# Patient Record
Sex: FEMALE | Race: BLACK OR AFRICAN AMERICAN | NOT HISPANIC OR LATINO | Employment: UNEMPLOYED | URBAN - METROPOLITAN AREA
[De-identification: names, ages, dates, MRNs, and addresses within clinical notes are randomized per-mention and may not be internally consistent; named-entity substitution may affect disease eponyms.]

---

## 2023-06-28 ENCOUNTER — APPOINTMENT (OUTPATIENT)
Dept: PRIMARY CARE | Facility: CLINIC | Age: 58
End: 2023-06-28
Payer: COMMERCIAL

## 2023-07-21 PROBLEM — R01.1 SYSTOLIC MURMUR: Status: ACTIVE | Noted: 2023-07-21

## 2023-07-21 PROBLEM — J44.9 CHRONIC OBSTRUCTIVE PULMONARY DISEASE (MULTI): Status: ACTIVE | Noted: 2023-07-21

## 2023-07-21 PROBLEM — G89.29 CHRONIC PAIN: Status: ACTIVE | Noted: 2023-07-21

## 2023-07-21 PROBLEM — N60.19 FIBROCYSTIC BREAST: Status: ACTIVE | Noted: 2023-07-21

## 2023-07-21 PROBLEM — R79.82 ELEVATED C-REACTIVE PROTEIN (CRP): Status: ACTIVE | Noted: 2023-07-21

## 2023-07-21 PROBLEM — Z85.51 HISTORY OF BLADDER CANCER: Status: ACTIVE | Noted: 2023-07-21

## 2023-07-21 PROBLEM — N39.41 URGE INCONTINENCE OF URINE: Status: ACTIVE | Noted: 2023-07-21

## 2023-07-21 PROBLEM — M19.90 OSTEOARTHRITIS: Status: ACTIVE | Noted: 2023-07-21

## 2023-07-21 PROBLEM — J31.0 RHINITIS: Status: ACTIVE | Noted: 2023-07-21

## 2023-07-21 PROBLEM — G47.00 INSOMNIA: Status: ACTIVE | Noted: 2023-07-21

## 2023-07-21 PROBLEM — R20.0 NUMBNESS: Status: ACTIVE | Noted: 2023-07-21

## 2023-07-21 PROBLEM — B35.1 ONYCHOMYCOSIS: Status: ACTIVE | Noted: 2023-07-21

## 2023-07-21 PROBLEM — R68.89 COLD INTOLERANCE: Status: ACTIVE | Noted: 2023-07-21

## 2023-07-21 PROBLEM — G62.9 NEUROPATHY: Status: ACTIVE | Noted: 2023-07-21

## 2023-07-21 PROBLEM — M79.89 RIGHT LEG SWELLING: Status: ACTIVE | Noted: 2023-07-21

## 2023-07-21 PROBLEM — L03.90 CELLULITIS: Status: ACTIVE | Noted: 2023-07-21

## 2023-07-21 PROBLEM — M51.369 DEGENERATIVE DISC DISEASE, LUMBAR: Status: ACTIVE | Noted: 2023-07-21

## 2023-07-21 PROBLEM — G89.29 CHRONIC ABDOMINAL PAIN: Status: ACTIVE | Noted: 2023-07-21

## 2023-07-21 PROBLEM — M79.601 BILATERAL ARM PAIN: Status: ACTIVE | Noted: 2023-07-21

## 2023-07-21 PROBLEM — G56.00 CARPAL TUNNEL SYNDROME: Status: ACTIVE | Noted: 2023-07-21

## 2023-07-21 PROBLEM — H10.30 CONJUNCTIVITIS, ACUTE: Status: ACTIVE | Noted: 2023-07-21

## 2023-07-21 PROBLEM — R53.83 FATIGUE: Status: ACTIVE | Noted: 2023-07-21

## 2023-07-21 PROBLEM — M79.602 BILATERAL ARM PAIN: Status: ACTIVE | Noted: 2023-07-21

## 2023-07-21 PROBLEM — F20.0 PARANOID SCHIZOPHRENIA (MULTI): Status: ACTIVE | Noted: 2023-07-21

## 2023-07-21 PROBLEM — R92.8 MAMMOGRAM ABNORMAL: Status: ACTIVE | Noted: 2023-07-21

## 2023-07-21 PROBLEM — N95.1 HOT FLASHES, MENOPAUSAL: Status: ACTIVE | Noted: 2023-07-21

## 2023-07-21 PROBLEM — D49.4 NEOPLASM OF BLADDER: Status: ACTIVE | Noted: 2023-07-21

## 2023-07-21 PROBLEM — M51.26 LUMBAR DISC HERNIATION: Status: ACTIVE | Noted: 2023-07-21

## 2023-07-21 PROBLEM — M79.10 MYALGIA: Status: ACTIVE | Noted: 2023-07-21

## 2023-07-21 PROBLEM — G60.9 IDIOPATHIC PERIPHERAL NEUROPATHY: Status: ACTIVE | Noted: 2023-07-21

## 2023-07-21 PROBLEM — M54.2 CERVICALGIA: Status: ACTIVE | Noted: 2023-07-21

## 2023-07-21 PROBLEM — F17.200 NICOTINE DEPENDENCE: Status: ACTIVE | Noted: 2023-07-21

## 2023-07-21 PROBLEM — M54.9 BACK PAIN, ACUTE: Status: ACTIVE | Noted: 2023-07-21

## 2023-07-21 PROBLEM — F41.9 ANXIETY: Status: ACTIVE | Noted: 2023-07-21

## 2023-07-21 PROBLEM — K52.9 GASTROENTERITIS: Status: ACTIVE | Noted: 2023-07-21

## 2023-07-21 PROBLEM — M62.838 MUSCLE SPASMS OF NECK: Status: ACTIVE | Noted: 2023-07-21

## 2023-07-21 PROBLEM — M25.522 LEFT ELBOW PAIN: Status: ACTIVE | Noted: 2023-07-21

## 2023-07-21 PROBLEM — M25.512 LEFT SHOULDER PAIN: Status: ACTIVE | Noted: 2023-07-21

## 2023-07-21 PROBLEM — R68.82 DECREASED SEX DRIVE: Status: ACTIVE | Noted: 2023-07-21

## 2023-07-21 PROBLEM — M51.36 DEGENERATIVE DISC DISEASE, LUMBAR: Status: ACTIVE | Noted: 2023-07-21

## 2023-07-21 PROBLEM — F40.01: Status: ACTIVE | Noted: 2023-07-21

## 2023-07-21 PROBLEM — R10.9 CHRONIC ABDOMINAL PAIN: Status: ACTIVE | Noted: 2023-07-21

## 2023-07-21 PROBLEM — I82.431 DEEP VEIN THROMBOSIS (DVT) OF POPLITEAL VEIN OF RIGHT LOWER EXTREMITY (MULTI): Status: ACTIVE | Noted: 2023-07-21

## 2023-07-21 PROBLEM — K21.9 GERD (GASTROESOPHAGEAL REFLUX DISEASE): Status: ACTIVE | Noted: 2023-07-21

## 2023-07-21 PROBLEM — F31.9: Status: ACTIVE | Noted: 2023-07-21

## 2023-07-21 PROBLEM — E66.3 OVERWEIGHT: Status: ACTIVE | Noted: 2023-07-21

## 2023-07-21 PROBLEM — N64.4 BREAST PAIN: Status: ACTIVE | Noted: 2023-07-21

## 2023-07-21 RX ORDER — ESCITALOPRAM OXALATE 10 MG/1
TABLET ORAL
COMMUNITY
Start: 2023-05-27

## 2023-07-21 RX ORDER — ACETAMINOPHEN AND CODEINE PHOSPHATE 300; 30 MG/1; MG/1
TABLET ORAL
COMMUNITY
Start: 2023-05-02

## 2023-07-21 RX ORDER — VORTIOXETINE 10 MG/1
TABLET, FILM COATED ORAL
COMMUNITY
Start: 2023-02-27

## 2023-07-21 RX ORDER — NAPROXEN 500 MG/1
TABLET ORAL
COMMUNITY
Start: 2023-05-02

## 2023-07-21 RX ORDER — ORPHENADRINE CITRATE 100 MG/1
TABLET, EXTENDED RELEASE ORAL
COMMUNITY
Start: 2022-11-29

## 2023-07-21 RX ORDER — BUDESONIDE AND FORMOTEROL FUMARATE DIHYDRATE 80; 4.5 UG/1; UG/1
AEROSOL RESPIRATORY (INHALATION)
COMMUNITY
Start: 2023-05-16

## 2023-07-21 RX ORDER — TRAZODONE HYDROCHLORIDE 50 MG/1
TABLET ORAL
COMMUNITY
Start: 2023-06-19

## 2023-07-21 RX ORDER — AMITRIPTYLINE HYDROCHLORIDE 10 MG/1
TABLET, FILM COATED ORAL
COMMUNITY
Start: 2020-01-22

## 2023-07-21 RX ORDER — OXYCODONE AND ACETAMINOPHEN 5; 325 MG/1; MG/1
TABLET ORAL
COMMUNITY
Start: 2023-05-30

## 2023-07-21 RX ORDER — ARIPIPRAZOLE 5 MG/1
TABLET ORAL
COMMUNITY
Start: 2020-01-22

## 2023-07-21 RX ORDER — BREXPIPRAZOLE 1 MG/1
TABLET ORAL
COMMUNITY
Start: 2023-04-20

## 2023-07-21 RX ORDER — CYCLOBENZAPRINE HCL 5 MG
TABLET ORAL
COMMUNITY
Start: 2022-10-16

## 2023-07-21 RX ORDER — COLCHICINE 0.6 MG/1
TABLET ORAL
COMMUNITY
Start: 2022-12-20

## 2023-07-21 RX ORDER — GABAPENTIN 400 MG/1
CAPSULE ORAL
COMMUNITY
Start: 2019-08-19

## 2023-07-21 RX ORDER — BACLOFEN 20 MG/1
TABLET ORAL
COMMUNITY
Start: 2019-08-19

## 2023-07-21 RX ORDER — FLUTICASONE PROPIONATE 50 MCG
SPRAY, SUSPENSION (ML) NASAL
COMMUNITY
Start: 2023-06-14

## 2023-07-21 RX ORDER — ALBUTEROL SULFATE 90 UG/1
AEROSOL, METERED RESPIRATORY (INHALATION)
COMMUNITY

## 2023-07-25 ENCOUNTER — APPOINTMENT (OUTPATIENT)
Dept: PRIMARY CARE | Facility: CLINIC | Age: 58
End: 2023-07-25
Payer: COMMERCIAL

## 2024-08-07 ENCOUNTER — HOSPITAL ENCOUNTER (OUTPATIENT)
Facility: HOSPITAL | Age: 59
Setting detail: OBSERVATION
Discharge: OTHER INSTITUTION | End: 2024-08-08
Attending: EMERGENCY MEDICINE | Admitting: STUDENT IN AN ORGANIZED HEALTH CARE EDUCATION/TRAINING PROGRAM
Payer: COMMERCIAL

## 2024-08-07 ENCOUNTER — CLINICAL SUPPORT (OUTPATIENT)
Dept: EMERGENCY MEDICINE | Facility: HOSPITAL | Age: 59
End: 2024-08-07
Payer: COMMERCIAL

## 2024-08-07 DIAGNOSIS — R45.851 SUICIDAL IDEATION: Primary | ICD-10-CM

## 2024-08-07 DIAGNOSIS — F32.A DEPRESSION, UNSPECIFIED DEPRESSION TYPE: ICD-10-CM

## 2024-08-07 LAB
ALBUMIN SERPL BCP-MCNC: 4.7 G/DL (ref 3.4–5)
ALP SERPL-CCNC: 118 U/L (ref 33–110)
ALT SERPL W P-5'-P-CCNC: 11 U/L (ref 7–45)
ANION GAP SERPL CALC-SCNC: 18 MMOL/L (ref 10–20)
APAP SERPL-MCNC: <10 UG/ML
AST SERPL W P-5'-P-CCNC: 18 U/L (ref 9–39)
ATRIAL RATE: 69 BPM
BASOPHILS # BLD AUTO: 0.05 X10*3/UL (ref 0–0.1)
BASOPHILS NFR BLD AUTO: 0.8 %
BILIRUB SERPL-MCNC: 1.1 MG/DL (ref 0–1.2)
BUN SERPL-MCNC: 8 MG/DL (ref 6–23)
CALCIUM SERPL-MCNC: 9.9 MG/DL (ref 8.6–10.6)
CHLORIDE SERPL-SCNC: 106 MMOL/L (ref 98–107)
CO2 SERPL-SCNC: 21 MMOL/L (ref 21–32)
CREAT SERPL-MCNC: 0.84 MG/DL (ref 0.5–1.05)
EGFRCR SERPLBLD CKD-EPI 2021: 80 ML/MIN/1.73M*2
EOSINOPHIL # BLD AUTO: 0.59 X10*3/UL (ref 0–0.7)
EOSINOPHIL NFR BLD AUTO: 9 %
ERYTHROCYTE [DISTWIDTH] IN BLOOD BY AUTOMATED COUNT: 12.5 % (ref 11.5–14.5)
ETHANOL SERPL-MCNC: <10 MG/DL
GLUCOSE SERPL-MCNC: 85 MG/DL (ref 74–99)
HCT VFR BLD AUTO: 47.4 % (ref 36–46)
HGB BLD-MCNC: 16.6 G/DL (ref 12–16)
HOLD SPECIMEN: NORMAL
IMM GRANULOCYTES # BLD AUTO: 0.02 X10*3/UL (ref 0–0.7)
IMM GRANULOCYTES NFR BLD AUTO: 0.3 % (ref 0–0.9)
LYMPHOCYTES # BLD AUTO: 2.68 X10*3/UL (ref 1.2–4.8)
LYMPHOCYTES NFR BLD AUTO: 40.8 %
MCH RBC QN AUTO: 30 PG (ref 26–34)
MCHC RBC AUTO-ENTMCNC: 35 G/DL (ref 32–36)
MCV RBC AUTO: 86 FL (ref 80–100)
MONOCYTES # BLD AUTO: 0.5 X10*3/UL (ref 0.1–1)
MONOCYTES NFR BLD AUTO: 7.6 %
NEUTROPHILS # BLD AUTO: 2.73 X10*3/UL (ref 1.2–7.7)
NEUTROPHILS NFR BLD AUTO: 41.5 %
NRBC BLD-RTO: 0 /100 WBCS (ref 0–0)
P AXIS: 33 DEGREES
P OFFSET: 211 MS
P ONSET: 170 MS
PLATELET # BLD AUTO: 286 X10*3/UL (ref 150–450)
POTASSIUM SERPL-SCNC: 3.7 MMOL/L (ref 3.5–5.3)
PR INTERVAL: 110 MS
PROT SERPL-MCNC: 8.6 G/DL (ref 6.4–8.2)
Q ONSET: 225 MS
QRS COUNT: 12 BEATS
QRS DURATION: 84 MS
QT INTERVAL: 422 MS
QTC CALCULATION(BAZETT): 452 MS
QTC FREDERICIA: 442 MS
R AXIS: 55 DEGREES
RBC # BLD AUTO: 5.53 X10*6/UL (ref 4–5.2)
SALICYLATES SERPL-MCNC: <3 MG/DL
SODIUM SERPL-SCNC: 141 MMOL/L (ref 136–145)
T AXIS: 59 DEGREES
T OFFSET: 436 MS
VENTRICULAR RATE: 69 BPM
WBC # BLD AUTO: 6.6 X10*3/UL (ref 4.4–11.3)

## 2024-08-07 PROCEDURE — 93005 ELECTROCARDIOGRAM TRACING: CPT

## 2024-08-07 PROCEDURE — 99285 EMERGENCY DEPT VISIT HI MDM: CPT

## 2024-08-07 PROCEDURE — 80053 COMPREHEN METABOLIC PANEL: CPT | Performed by: NURSE PRACTITIONER

## 2024-08-07 PROCEDURE — 99285 EMERGENCY DEPT VISIT HI MDM: CPT | Performed by: NURSE PRACTITIONER

## 2024-08-07 PROCEDURE — 85025 COMPLETE CBC W/AUTO DIFF WBC: CPT | Performed by: NURSE PRACTITIONER

## 2024-08-07 PROCEDURE — 36415 COLL VENOUS BLD VENIPUNCTURE: CPT | Performed by: NURSE PRACTITIONER

## 2024-08-07 PROCEDURE — 80143 DRUG ASSAY ACETAMINOPHEN: CPT | Performed by: NURSE PRACTITIONER

## 2024-08-07 PROCEDURE — 93010 ELECTROCARDIOGRAM REPORT: CPT | Performed by: NURSE PRACTITIONER

## 2024-08-07 SDOH — HEALTH STABILITY: MENTAL HEALTH: IN THE PAST FEW WEEKS, HAVE YOU FELT THAT YOU OR YOUR FAMILY WOULD BE BETTER OFF IF YOU WERE DEAD?: YES

## 2024-08-07 SDOH — HEALTH STABILITY: MENTAL HEALTH: IN THE PAST WEEK, HAVE YOU BEEN HAVING THOUGHTS ABOUT KILLING YOURSELF?: YES

## 2024-08-07 SDOH — HEALTH STABILITY: MENTAL HEALTH: HAVE YOU EVER TRIED TO KILL YOURSELF?: YES

## 2024-08-07 SDOH — HEALTH STABILITY: MENTAL HEALTH: WHEN DID YOU TRY TO KILL YOURSELF?: LAST YEAR

## 2024-08-07 SDOH — HEALTH STABILITY: MENTAL HEALTH: HOW DID YOU TRY TO KILL YOURSELF?: OD

## 2024-08-07 SDOH — HEALTH STABILITY: MENTAL HEALTH: ARE YOU HAVING THOUGHTS OF KILLING YOURSELF RIGHT NOW?: NO

## 2024-08-07 SDOH — HEALTH STABILITY: MENTAL HEALTH: SUICIDAL BEHAVIOR (LIFETIME): YES

## 2024-08-07 SDOH — HEALTH STABILITY: MENTAL HEALTH: SUICIDAL BEHAVIOR (DESCRIPTION): OD LAST YEAR

## 2024-08-07 SDOH — HEALTH STABILITY: MENTAL HEALTH: ACTIVE SUICIDAL IDEATION WITH SPECIFIC PLAN AND INTENT (PAST 1 MONTH): YES

## 2024-08-07 SDOH — HEALTH STABILITY: MENTAL HEALTH: ANXIETY SYMPTOMS: GENERALIZED

## 2024-08-07 SDOH — HEALTH STABILITY: MENTAL HEALTH: IN THE PAST FEW WEEKS, HAVE YOU WISHED YOU WERE DEAD?: YES

## 2024-08-07 SDOH — ECONOMIC STABILITY: HOUSING INSECURITY

## 2024-08-07 SDOH — HEALTH STABILITY: MENTAL HEALTH: WISH TO BE DEAD (PAST 1 MONTH): YES

## 2024-08-07 SDOH — HEALTH STABILITY: MENTAL HEALTH: NON-SPECIFIC ACTIVE SUICIDAL THOUGHTS (PAST 1 MONTH): YES

## 2024-08-07 SDOH — HEALTH STABILITY: MENTAL HEALTH: ACTIVE SUICIDAL IDEATION WITH SOME INTENT TO ACT, WITHOUT SPECIFIC PLAN (PAST 1 MONTH): YES

## 2024-08-07 SDOH — HEALTH STABILITY: MENTAL HEALTH
DEPRESSION SYMPTOMS: APPETITE CHANGE;CHANGE IN ENERGY LEVEL;CRYING;FEELINGS OF HELPLESSNESS;FEELINGS OF WORTHLESSNESS;FEELINGS OF HOPELESSESS;IMPAIRED CONCENTRATION;LOSS OF INTEREST;ISOLATIVE;SLEEP DISTURBANCE

## 2024-08-07 SDOH — HEALTH STABILITY: MENTAL HEALTH: SUICIDAL BEHAVIOR (3 MONTHS): NO

## 2024-08-07 ASSESSMENT — PAIN - FUNCTIONAL ASSESSMENT: PAIN_FUNCTIONAL_ASSESSMENT: 0-10

## 2024-08-07 ASSESSMENT — LIFESTYLE VARIABLES
HAVE PEOPLE ANNOYED YOU BY CRITICIZING YOUR DRINKING: NO
EVER FELT BAD OR GUILTY ABOUT YOUR DRINKING: NO
HAVE YOU EVER FELT YOU SHOULD CUT DOWN ON YOUR DRINKING: NO
PRESCIPTION_ABUSE_PAST_12_MONTHS: NO
SUBSTANCE_ABUSE_PAST_12_MONTHS: YES
TOTAL SCORE: 0
EVER HAD A DRINK FIRST THING IN THE MORNING TO STEADY YOUR NERVES TO GET RID OF A HANGOVER: NO

## 2024-08-07 ASSESSMENT — PAIN SCALES - GENERAL: PAINLEVEL_OUTOF10: 0 - NO PAIN

## 2024-08-07 ASSESSMENT — COLUMBIA-SUICIDE SEVERITY RATING SCALE - C-SSRS
4. HAVE YOU HAD THESE THOUGHTS AND HAD SOME INTENTION OF ACTING ON THEM?: NO
5. HAVE YOU STARTED TO WORK OUT OR WORKED OUT THE DETAILS OF HOW TO KILL YOURSELF? DO YOU INTEND TO CARRY OUT THIS PLAN?: NO
6. HAVE YOU EVER DONE ANYTHING, STARTED TO DO ANYTHING, OR PREPARED TO DO ANYTHING TO END YOUR LIFE?: NO
2. HAVE YOU ACTUALLY HAD ANY THOUGHTS OF KILLING YOURSELF?: NO
1. IN THE PAST MONTH, HAVE YOU WISHED YOU WERE DEAD OR WISHED YOU COULD GO TO SLEEP AND NOT WAKE UP?: YES

## 2024-08-07 NOTE — ED PROVIDER NOTES
"HPI   No chief complaint on file.        History provided by:  Patient   used: No        59-year-old female with past medical history anxiety, depression and schizophrenia presents for psychiatric evaluation accompanied by police.  Per police they were called because the patient was crying after relapsing on crack cocaine and was kicked out of her house 4 days ago.  She states that she is hearing voices that are telling her to kill herself but there is no plan.  She denies any visual hallucinations or homicidal ideation.  She states that she has not taken her medications including Seroquel and \"however long \"but will not give a reason why.  She states she has not eaten in 4 days.  She denies any trauma, fall, injury or anticoagulation use.  Denies any excessive alcohol use.  Denies any chest pain or shortness of breath.  Denies any additional symptoms or complaints this time.    ROS is otherwise negative unless stated above.    Patient History   Past Medical History:   Diagnosis Date    Personal history of malignant neoplasm of bladder 02/29/2016    History of malignant neoplasm of bladder    Personal history of nicotine dependence 05/07/2019    History of nicotine dependence    Personal history of other diseases of the female genital tract 05/07/2019    History of breast pain    Personal history of other diseases of the musculoskeletal system and connective tissue 09/07/2017    History of muscle pain    Personal history of other specified conditions 05/07/2019    History of insomnia    Personal history of other specified conditions 04/18/2017    History of insomnia    Polyneuropathy, unspecified 11/29/2022    Neuropathy    Unspecified convulsions (Multi) 12/13/2015    Seizure     Past Surgical History:   Procedure Laterality Date    BLADDER SURGERY  02/12/2014    Bladder Surgery    CARPAL TUNNEL RELEASE  03/27/2014    Neuroplasty Decompression Median Nerve At Carpal Tunnel    OTHER SURGICAL " HISTORY  02/04/2019    Laparotomy     Family History   Problem Relation Name Age of Onset    No Known Problems Mother      No Known Problems Father       Social History     Tobacco Use    Smoking status: Not on file    Smokeless tobacco: Not on file   Substance Use Topics    Alcohol use: Not on file    Drug use: Not on file       Physical Exam   ED Triage Vitals   Temp Pulse Resp BP   -- -- -- --      SpO2 Temp src Heart Rate Source Patient Position   -- -- -- --      BP Location FiO2 (%)     -- --       Physical Exam    VS: As documented in the triage note and EMR flowsheet from this visit were reviewed.    GEN: NAD, nontoxic, unkempt-appearing, ambulates without difficulty  EYES:  EOMs grossly intact, anicteric sclera, no nystagmus noted, clear and equal bilaterally, no foreign body noted  HEENT: Airway patent, ears with clear tympanic membranes bilaterally. Nasal mucosa clear. Mouth with normal mucosa.  No area of abscess or fluctuance noted.  No drainage noted. Throat has no vesicles, no oropharyngeal exudates and uvula is midline. Face with no lymph node enlargement. No trismus or drooling noted.  Handling secretions.  Speech clear.  CARD: RRR, nontender chest, no crepitus deformities, no JVD, no murmurs rubs or gallops ; No edema noted.  Positive pulses bilaterally throughout.  Capillary refill less than 3 seconds.  No abnormal redness, warmth, tenderness or swelling noted to bilateral lower extremities.  PULMONARY: Clear all lung fields. Moving air well, Nonlabored, no accessory muscle use, able to speak complete sentences  ABDOMEN: Abdomen soft, non-distended, no rebound, no guarding. Bowel sounds normal in all 4 quadrants. No tenderness to palpation.  No CVA tenderness.  No masses or organomegaly noted.  No evidence of peritonitis. Negative Allen's and McBurney's point tenderness.    : deferred  MUSK: Spine appears normal, range of motion is not limited, no muscle or joint tenderness. Strength 5 out of 5  equal bilaterally throughout.  No step-offs, deformities or additional signs of trauma noted.  No spinal/midline tenderness to palpation  SKIN: Skin normal color for race, warm, dry and intact. No evidence of trauma. No rash noted.  NEURO: Alert and oriented x 3, speech is clear, no obvious deficits noted. No facial droop noted.    PSYCH: Alert and oriented to person, place, time/situation.  Depressed, tearful mood and affect. No apparent risk to others. Thoughts are linear.  Does appear decompensated.  Does not appear internally stimulated.  LYMPH: No adenopathy or splenomegaly. No cervical, supraclavicular or inguinal lymphadenopathy.  ED Course & MDM   ED Course as of 08/07/24 1602   Wed Aug 07, 2024   1246 Electrocardiogram, 12-lead  Interpreted by me.  8/7/2024 at 1228.  Sinus rhythm 69 bpm.   QRS 84 QTc 452.  No ST elevation. [MC]      ED Course User Index  [MC] Yefri Yin MD         Diagnoses as of 08/07/24 1602   Suicidal ideation   Depression, unspecified depression type                 No data recorded                                 Medical Decision Making    Upon assessment patient is a tearful, unkempt female in no apparent distress.  She is ambulating independently without difficulty or dyspnea.  She is calm and cooperative and does not appear to be hallucinating on my examination.  Assessment is otherwise reassuring.  Plan is to obtain laboratory studies, urine studies, EKG and speak with EPAT.  She required no medications at this time.    Workup was reviewed.  EPAT did perform the evaluation and recommended inpatient psychiatric placement for further care and management.  She is medically clear for placement.  She remained hemodynamically stable calm and cooperative throughout my ED course of care.  Findings and plan were discussed the patient and she acknowledged understanding of plan.  All questions and concerns were addressed prior to the end of my shift.  She is signed out to [facility]  provider pending EPAT placement.    EKG Interpretation: Normal sinus rhythm at a rate of 69, , QRS 84, QTc 452 without evidence of STEMI or ischemia    Differential Diagnoses Considered: Situational depression, hallucinations, decompensated schizophrenia, Decompensated depression, homelessness, anxiety, drug abuse    Chronic Medical Conditions Significantly Affecting Care: depression    External Records Reviewed: I reviewed recent and relevant outside records including: PCP notes, prior discharge summary, previous radiologic studies, HIE    Independent Interpretation of Studies:  I independently interpreted: none    Escalation of Care:  Signed out to oncoming provider pending EPAT placement    Social Determinants of Health Significantly Affecting Care:  Homelessness, lacking transportation, uninsured, unable to afford medications    Prescription Drug Consideration:N/A    Diagnostic testing considered: No additional indicated at this time    Discussion of Management with Other Providers:   I discussed the patient/results with: YUAN, oncoming provider      This patient was staffed with ED Attending Dr. Yin to review the plan of care during ED course.    *Please note that portions of this note may have been completed with a voice recognition program.  Efforts were made to edit the dictations but occasionally, words are mis-transcribed.      Procedure  Procedures     CHIKIS Riddle-MONET  08/07/24 1440       CHIKIS Riddle-CNP  08/07/24 160

## 2024-08-07 NOTE — ED TRIAGE NOTES
"Brought in by [facility] for psych eval. Pt states she relapse on cocaine and \"doesn't feel good about it\" pt states she is SI because of her relapse but does not have a plan. Pt denies HI/AH/AV at this time   "

## 2024-08-08 VITALS
RESPIRATION RATE: 18 BRPM | SYSTOLIC BLOOD PRESSURE: 138 MMHG | OXYGEN SATURATION: 97 % | WEIGHT: 153 LBS | BODY MASS INDEX: 27.11 KG/M2 | TEMPERATURE: 97.8 F | DIASTOLIC BLOOD PRESSURE: 85 MMHG | HEIGHT: 63 IN | HEART RATE: 63 BPM

## 2024-08-08 PROBLEM — R45.851 SUICIDAL IDEATION: Status: ACTIVE | Noted: 2024-08-08

## 2024-08-08 LAB
AMPHETAMINES UR QL SCN: ABNORMAL
BARBITURATES UR QL SCN: ABNORMAL
BENZODIAZ UR QL SCN: ABNORMAL
BZE UR QL SCN: ABNORMAL
CANNABINOIDS UR QL SCN: ABNORMAL
CK SERPL-CCNC: 69 U/L (ref 0–215)
FENTANYL+NORFENTANYL UR QL SCN: ABNORMAL
METHADONE UR QL SCN: ABNORMAL
OPIATES UR QL SCN: ABNORMAL
OXYCODONE+OXYMORPHONE UR QL SCN: ABNORMAL
PCP UR QL SCN: ABNORMAL

## 2024-08-08 PROCEDURE — 80307 DRUG TEST PRSMV CHEM ANLYZR: CPT | Performed by: NURSE PRACTITIONER

## 2024-08-08 PROCEDURE — 82550 ASSAY OF CK (CPK): CPT | Performed by: EMERGENCY MEDICINE

## 2024-08-08 PROCEDURE — G0378 HOSPITAL OBSERVATION PER HR: HCPCS

## 2024-08-08 PROCEDURE — 36415 COLL VENOUS BLD VENIPUNCTURE: CPT | Performed by: EMERGENCY MEDICINE

## 2024-08-08 NOTE — SIGNIFICANT EVENT
Application for Emergency Admission      Ready for Transfer?  Is the patient medically cleared for transfer to inpatient psychiatry: Yes  Has the patient been accepted to an inpatient psychiatric hospital: Yes    Application for Emergency Admission  IN ACCORDANCE WITH [place]122[place]  The Chief Clinical Officer of: Arianna 8/8/2024 .6:58 AM    Reason for Hospitalization  The undersigned has reason to believe that: Sylvia Archuleta Is a mentally ill person subject to hospitalization by court order under division B Section 5122.01 of the Revised Code, i.e., this person:    1.Yes  Represents a substantial risk of physical harm to self as manifested by evidence of threats of, or attempts at, suicide or serious self-inflicted bodily harm    2.No Represents a substantial risk of physical harm to others as manifested by evidence of recent homicidal or other violent behavior, evidence of recent threats that place another in reasonable fear of violent behavior and serious physical harm, or other evidence of present dangerousness    3.Yes Represents a substantial and immediate risk of serious physical impairment or injury to self as manifested by  evidence that the person is unable to provide for and is not providing for the person's basic physical needs because of the person's mental illness and that appropriate provision for those needs cannot be made  immediately available in the community    4.Yes Would benefit from treatment in a hospital for his mental illness and is in need of such treatment as manifested by evidence of behavior that creates a grave and imminent risk to substantial rights of others or  himself.    5.Yes Would benefit from treatment as manifested by evidence of behavior that indicates all of the following:       (a) The person is unlikely to survive safely in the community without supervision, based on a clinical determination.       (b) The person has a history of lack of compliance with  treatment for mental illness and one of the following applies:      (i) At least twice within the thirty-six months prior to the filing of an affidavit seeking court-ordered treatment of the person under section 5122.111 of the Revised Code, the lack of compliance has been a significant factor in necessitating hospitalization in a hospital or receipt of services in a forensic or other mental health unit of a correctional facility, provided that the thirty-six-month period shall be extended by the length of any hospitalization or incarceration of the person that occurred within the thirty-six-month period.      (ii) Within the forty-eight months prior to the filing of an affidavit seeking court-ordered treatment of the person under section 5122.111 of the Revised Code, the lack of compliance resulted in one or more acts of serious violent behavior toward self or others or threats of, or attempts at, serious physical harm to self or others, provided that the forty-eight-month period shall be extended by the length of any hospitalization or incarceration of the person that occurred within the forty-eight-month period.      (c) The person, as a result of mental illness, is unlikely to voluntarily participate in necessary treatment.       (d) In view of the person's treatment history and current behavior, the person is in need of treatment in order to prevent a relapse or deterioration that would be likely to result in substantial risk of serious harm to the person or others.    (e) Represents a substantial risk of physical harm to self or others if allowed to remain at liberty pending examination.    Therefore, it is requested that said person be admitted to the above named facility.    STATEMENT OF BELIEF    Must be filled out by one of the following: a psychiatrist, licensed physician, licensed clinical psychologist, health or ,  or .  (Statement shall include the circumstances under  which the individual was taken into custody and the reason for the person's belief that hospitalization is necessary. The statement shall also include a reference to efforts made to secure the individual's property at his residence if he was taken into custody there. Every reasonable and appropriate effort should be made to take this person into custody in the least conspicuous manner possible.)    Female with history of anxiety, depression, schizophrenia brought in for suicidal ideation.  Poor protective factors, having persistent thoughts of death, SI, helplessness, hopelessness with an increased risk of harm to herself that would benefit from inpatient safety and stabilization.    Angela Fink,  8/8/2024     _____________________________________________________________   Place of Employment: [employer] [employer]    STATEMENT OF OBSERVATION BY PSYCHIATRIST, LICENSED PHYSICIAN, OR LICENSED CLINICAL PSYCHOLOGIST, IF APPLICABLE    Place of Observation (e.g., [facility] mental health center, general hospital, office, emergency facility)  (If applicable, please complete)    Angela Fink,  8/8/2024    _____________________________________________________________

## 2024-08-08 NOTE — H&P
"Observation History and Physical  [facility] EMERGENCY MEDICINE           History of Present Illness     History provided by: Patient  Limitations to History: None  External Records Reviewed: see initial provider note      Patient History:  Sylvia Archuleta is a 59 y.o. female who presented to the emergency department with SI.     Sylvia Archuleta was escalated to observation status. Observation was necessary as they continue to require treatment and monitoring of their psychiatric illness while awaiting inpatient behavioral health bed availability.    Physical Exam     Visit Vitals  /80   Pulse 81   Temp 36.7 °C (98 °F)   Resp 17   Ht 1.6 m (5' 3\")   Wt 69.4 kg (153 lb)   SpO2 97%   BMI 27.10 kg/m²   BSA 1.76 m²       GENERAL:  The patient appears nourished and normally developed. Vital signs as documented.     PULMONARY:  Without any respiratory distress. Able to speak full sentences, no accessory muscle use    CARDIAC: Warm and well perfused. No cyanosis.    MUSCULOSKELETAL:   Able to ambulate, Non edematous, with no obvious deformities.     SKIN: No pallor. Intact.    NEURO:  No obvious neurological deficits.  Able to follow commands.    Psych: endorses SI with plan      Impression and Plan     ED Course as of 08/08/24 0002   Wed Aug 07, 2024   1246 Electrocardiogram, 12-lead  Interpreted by me.  8/7/2024 at 1228.  Sinus rhythm 69 bpm.   QRS 84 QTc 452.  No ST elevation. [MC]      ED Course User Index  [MC] Yefri Yin MD         Diagnoses as of 08/08/24 0002   Suicidal ideation   Depression, unspecified depression type        Sylvia Archuleta under observation status in [facility] EM[facility] MEDICINE for psychiatric illness monitoring and treatment while awaiting inpatient behavioral health bed availability.   Emergency Psychiatric Assessment Team has been consulted. Case discussed with them and decision for inpatient hospitalization deemed necessary. Patient is " medically clear at this time.     Home medication reconciliation was reviewed and restarted where clinically indicated.     Patient and Family updated on plan of care.     Steffen Simerlink, MD

## 2024-08-17 ENCOUNTER — APPOINTMENT (OUTPATIENT)
Dept: RADIOLOGY | Facility: HOSPITAL | Age: 59
End: 2024-08-17
Payer: COMMERCIAL

## 2024-08-17 ENCOUNTER — HOSPITAL ENCOUNTER (EMERGENCY)
Facility: HOSPITAL | Age: 59
Discharge: HOME | End: 2024-08-17
Attending: EMERGENCY MEDICINE
Payer: COMMERCIAL

## 2024-08-17 VITALS
HEIGHT: 63 IN | HEART RATE: 83 BPM | DIASTOLIC BLOOD PRESSURE: 83 MMHG | OXYGEN SATURATION: 96 % | WEIGHT: 140 LBS | TEMPERATURE: 97.7 F | BODY MASS INDEX: 24.8 KG/M2 | SYSTOLIC BLOOD PRESSURE: 102 MMHG | RESPIRATION RATE: 16 BRPM

## 2024-08-17 DIAGNOSIS — J44.1 COPD EXACERBATION (MULTI): Primary | ICD-10-CM

## 2024-08-17 LAB
ANION GAP BLDV CALCULATED.4IONS-SCNC: 11 MMOL/L (ref 10–25)
ANION GAP SERPL CALC-SCNC: 11 MMOL/L (ref 10–20)
BASE EXCESS BLDV CALC-SCNC: -2.6 MMOL/L (ref -2–3)
BASOPHILS # BLD AUTO: 0.05 X10*3/UL (ref 0–0.1)
BASOPHILS NFR BLD AUTO: 0.6 %
BODY TEMPERATURE: 37 DEGREES CELSIUS
BUN SERPL-MCNC: 11 MG/DL (ref 6–23)
CA-I BLDV-SCNC: 1.2 MMOL/L (ref 1.1–1.33)
CALCIUM SERPL-MCNC: 8.4 MG/DL (ref 8.6–10.6)
CARDIAC TROPONIN I PNL SERPL HS: <3 NG/L (ref 0–34)
CARDIAC TROPONIN I PNL SERPL HS: <3 NG/L (ref 0–34)
CHLORIDE BLDV-SCNC: 107 MMOL/L (ref 98–107)
CHLORIDE SERPL-SCNC: 108 MMOL/L (ref 98–107)
CO2 SERPL-SCNC: 24 MMOL/L (ref 21–32)
CREAT SERPL-MCNC: 0.78 MG/DL (ref 0.5–1.05)
EGFRCR SERPLBLD CKD-EPI 2021: 88 ML/MIN/1.73M*2
EOSINOPHIL # BLD AUTO: 1.07 X10*3/UL (ref 0–0.7)
EOSINOPHIL NFR BLD AUTO: 13.7 %
ERYTHROCYTE [DISTWIDTH] IN BLOOD BY AUTOMATED COUNT: 12.2 % (ref 11.5–14.5)
GLUCOSE BLDV-MCNC: 135 MG/DL (ref 74–99)
GLUCOSE SERPL-MCNC: 84 MG/DL (ref 74–99)
HCO3 BLDV-SCNC: 21.8 MMOL/L (ref 22–26)
HCT VFR BLD AUTO: 44.3 % (ref 36–46)
HCT VFR BLD EST: 46 % (ref 36–46)
HGB BLD-MCNC: 14.8 G/DL (ref 12–16)
HGB BLDV-MCNC: 15.2 G/DL (ref 12–16)
IMM GRANULOCYTES # BLD AUTO: 0.01 X10*3/UL (ref 0–0.7)
IMM GRANULOCYTES NFR BLD AUTO: 0.1 % (ref 0–0.9)
LACTATE BLDV-SCNC: 1.3 MMOL/L (ref 0.4–2)
LYMPHOCYTES # BLD AUTO: 3.36 X10*3/UL (ref 1.2–4.8)
LYMPHOCYTES NFR BLD AUTO: 43 %
MCH RBC QN AUTO: 30 PG (ref 26–34)
MCHC RBC AUTO-ENTMCNC: 33.4 G/DL (ref 32–36)
MCV RBC AUTO: 90 FL (ref 80–100)
MONOCYTES # BLD AUTO: 0.56 X10*3/UL (ref 0.1–1)
MONOCYTES NFR BLD AUTO: 7.2 %
NEUTROPHILS # BLD AUTO: 2.77 X10*3/UL (ref 1.2–7.7)
NEUTROPHILS NFR BLD AUTO: 35.4 %
NRBC BLD-RTO: 0 /100 WBCS (ref 0–0)
OXYHGB MFR BLDV: 90.4 % (ref 45–75)
PCO2 BLDV: 36 MM HG (ref 41–51)
PH BLDV: 7.39 PH (ref 7.33–7.43)
PLATELET # BLD AUTO: 241 X10*3/UL (ref 150–450)
PO2 BLDV: 64 MM HG (ref 35–45)
POTASSIUM BLDV-SCNC: 3.7 MMOL/L (ref 3.5–5.3)
POTASSIUM SERPL-SCNC: 3.6 MMOL/L (ref 3.5–5.3)
RBC # BLD AUTO: 4.93 X10*6/UL (ref 4–5.2)
SAO2 % BLDV: 93 % (ref 45–75)
SARS-COV-2 RNA RESP QL NAA+PROBE: NOT DETECTED
SODIUM BLDV-SCNC: 136 MMOL/L (ref 136–145)
SODIUM SERPL-SCNC: 139 MMOL/L (ref 136–145)
WBC # BLD AUTO: 7.8 X10*3/UL (ref 4.4–11.3)

## 2024-08-17 PROCEDURE — 84132 ASSAY OF SERUM POTASSIUM: CPT

## 2024-08-17 PROCEDURE — 99283 EMERGENCY DEPT VISIT LOW MDM: CPT | Mod: 25

## 2024-08-17 PROCEDURE — 84484 ASSAY OF TROPONIN QUANT: CPT

## 2024-08-17 PROCEDURE — 85025 COMPLETE CBC W/AUTO DIFF WBC: CPT

## 2024-08-17 PROCEDURE — 71046 X-RAY EXAM CHEST 2 VIEWS: CPT

## 2024-08-17 PROCEDURE — 87635 SARS-COV-2 COVID-19 AMP PRB: CPT

## 2024-08-17 PROCEDURE — 36415 COLL VENOUS BLD VENIPUNCTURE: CPT

## 2024-08-17 PROCEDURE — 71046 X-RAY EXAM CHEST 2 VIEWS: CPT | Performed by: RADIOLOGY

## 2024-08-17 ASSESSMENT — LIFESTYLE VARIABLES
HAVE YOU EVER FELT YOU SHOULD CUT DOWN ON YOUR DRINKING: NO
EVER HAD A DRINK FIRST THING IN THE MORNING TO STEADY YOUR NERVES TO GET RID OF A HANGOVER: NO
TOTAL SCORE: 0
HAVE PEOPLE ANNOYED YOU BY CRITICIZING YOUR DRINKING: NO
EVER FELT BAD OR GUILTY ABOUT YOUR DRINKING: NO

## 2024-08-17 ASSESSMENT — COLUMBIA-SUICIDE SEVERITY RATING SCALE - C-SSRS
1. IN THE PAST MONTH, HAVE YOU WISHED YOU WERE DEAD OR WISHED YOU COULD GO TO SLEEP AND NOT WAKE UP?: NO
6. HAVE YOU EVER DONE ANYTHING, STARTED TO DO ANYTHING, OR PREPARED TO DO ANYTHING TO END YOUR LIFE?: NO
2. HAVE YOU ACTUALLY HAD ANY THOUGHTS OF KILLING YOURSELF?: NO

## 2024-08-17 ASSESSMENT — PAIN SCALES - GENERAL
PAINLEVEL_OUTOF10: 0 - NO PAIN
PAINLEVEL_OUTOF10: 0 - NO PAIN

## 2024-08-17 ASSESSMENT — PAIN - FUNCTIONAL ASSESSMENT: PAIN_FUNCTIONAL_ASSESSMENT: 0-10

## 2024-08-17 NOTE — Clinical Note
Sylvia Archuleta was seen and treated in our emergency department on 8/17/2024.  She may return to work on 08/19/2024.  To whom it may concern,  Sylvia Archuleta was seen by me in the emergency department, please permit her to rest for at least 24 hours in order to avoid exacerbating her condition which may require another trip to the hospital  Sid Gutierrez MD  Resident physician [facility]3]     If you have any questions or concerns, please don't hesitate to call.      Sid Gutierrez MD

## 2024-08-17 NOTE — ED PROVIDER NOTES
Emergency Department Provider Note        History of Present Illness     History provided by: Patient  Limitations to History: None  External Records Reviewed with Brief Summary:  Previous ED visit on 6/14/2023 shows that the patient has had a previous COPD exacerbation    HPI:  Sylvia Archuleta is a 59 y.o. female with history of COPD presenting today for shortness of breath and chest pressure.  She reports that her shortness of breath started about 4 days ago when she felt that she was getting a cold with a dry cough and has been getting progressively worse.  She reports that she frequently requires breaks while walking upstairs and while chewing.  She reports that her pain/pressure is mostly with deep inspiration.  Additionally, she got a breathing treatment by EMS en route.  She reports that she is feeling much better after that.   She is currently recovering from alcohol use disorder and lives in a sobriety house with other people, she notes that she is concerned that she might have COVID.  Patient was also complaining of some mouth discomfort after increased use of home inhalers.    Physical Exam   Triage vitals:  T 36.5 °C (97.7 °F)  HR 90  /81  RR 18  O2 95 % None (Room air)    Physical Exam  Constitutional:       Appearance: She is not ill-appearing or diaphoretic.   HENT:      Head: Normocephalic and atraumatic.      Mouth/Throat:      Mouth: Mucous membranes are moist.      Pharynx: Oropharynx is clear. No oropharyngeal exudate or posterior oropharyngeal erythema.   Cardiovascular:      Rate and Rhythm: Normal rate and regular rhythm.      Pulses: Normal pulses.      Heart sounds: Normal heart sounds. No murmur heard.     No friction rub. No gallop.   Pulmonary:      Effort: Pulmonary effort is normal. No respiratory distress.      Breath sounds: Normal breath sounds. No stridor. No decreased breath sounds or wheezing.   Chest:      Chest wall: No tenderness.   Abdominal:      Palpations: Abdomen  is soft.      Tenderness: There is no abdominal tenderness. There is no guarding or rebound.   Musculoskeletal:      Right lower leg: No edema.      Left lower leg: No edema.   Skin:     General: Skin is warm and dry.      Capillary Refill: Capillary refill takes less than 2 seconds.   Neurological:      Mental Status: She is alert.          Medical Decision Making & ED Course   Medical Decision Makin y.o. female presenting with dry cough and shortness of breath from a sobriety home.  Workup showed a negative chest x-ray, negative troponin and delta, unremarkable CBC and BMP, and negative COVID swab.   In a patient with normal labs, CXR, EKG who has a history of COPD with some evidence of a respiratory virus with a history of COPD and is significant improvement after 1 DuoNeb administered en route by EMS this patient likely suffered from a mild COPD exacerbation which now appears to be resolved.    Patient is stable and feels safe to return home at the time of discharge.     Scoring tools used: Wells = 0, PERC negative ; no need to obtain CT PE at this time  ----      Differential diagnoses considered include but are not limited to: COPD exacerbation, most likely.  Additional considerations include ACS, PE,     Social Determinants of Health which Significantly Impact Care:  Patient recovering from alcohol use disorder, no interventions at this time as the patient is already living in a sobriety house and reports doing well      EKG Independent Interpretation:  EKG obtained on  at 0 833 interpreted by me shows normal sinus rhythm with ventricular rate of 92 bpm, DE interval is normal, narrow QRS, QTc 452 ms.  Upright axis, no evidence of ischemia    Independent Result Review and Interpretation: Chest X-Ray as interpreted by me revealed no evidence of pneumonia or other lung process    Chronic conditions affecting the patient's care: As documented above in MDM    The patient was discussed with the following  consultants/services: None    Care Considerations: As documented above in [facility]    ED Course:  Diagnoses as of 08/17/24 1420   COPD exacerbation (Multi)     Disposition   As a result of the work-up, the patient was discharged home.  she was informed of her diagnosis and instructed to come back with any concerns or worsening of condition.  she and was agreeable to the plan as discussed above.  she was given the opportunity to ask questions.  All of the patient's questions were answered.    Procedures   Procedures    Patient seen and discussed with ED attending physician.    Sid Gutierrez MD  Emergency Medicine       Sid Gutierrez MD  Resident  08/17/24 1434       Sid Gutierrez MD  Resident  08/17/24 [id number]

## 2024-08-17 NOTE — Clinical Note
Sylvia Archuleta was seen and treated in our emergency department on 8/17/2024.  She may return to work on 08/19/2024.       If you have any questions or concerns, please don't hesitate to call.      Sid Gutierrez MD

## 2024-08-17 NOTE — ED TRIAGE NOTES
Patient presents from home with complaint of shortness of breath for 3-4 days, endorses non-productive cough and mid-sternal chest pain when coughing. Patient denies abdominal pain, nausea, vomiting and sick contacts. Patient has been living in sober housing for the last 45 days.

## 2024-08-17 NOTE — DISCHARGE INSTRUCTIONS
If you begin to experience increased shortness of breath, chest pain, or chest pressure once again please be sure to return to an emergency department for further evaluation.  Otherwise, try to take frequent breaks until your cold is resolved in order to avoid exacerbating your COPD again.   Please try to follow-up with your primary care doctor in the next couple of weeks to discuss other ways to manage your COPD

## 2024-08-21 ENCOUNTER — HOSPITAL ENCOUNTER (EMERGENCY)
Facility: HOSPITAL | Age: 59
Discharge: HOME | End: 2024-08-21
Attending: EMERGENCY MEDICINE
Payer: COMMERCIAL

## 2024-08-21 ENCOUNTER — APPOINTMENT (OUTPATIENT)
Dept: RADIOLOGY | Facility: HOSPITAL | Age: 59
End: 2024-08-21
Payer: COMMERCIAL

## 2024-08-21 ENCOUNTER — CLINICAL SUPPORT (OUTPATIENT)
Dept: EMERGENCY MEDICINE | Facility: HOSPITAL | Age: 59
End: 2024-08-21
Payer: COMMERCIAL

## 2024-08-21 VITALS
OXYGEN SATURATION: 98 % | SYSTOLIC BLOOD PRESSURE: 132 MMHG | DIASTOLIC BLOOD PRESSURE: 93 MMHG | HEART RATE: 81 BPM | WEIGHT: 146 LBS | TEMPERATURE: 97.9 F | BODY MASS INDEX: 25.86 KG/M2 | RESPIRATION RATE: 20 BRPM

## 2024-08-21 VITALS
DIASTOLIC BLOOD PRESSURE: 84 MMHG | HEIGHT: 63 IN | SYSTOLIC BLOOD PRESSURE: 114 MMHG | HEART RATE: 91 BPM | BODY MASS INDEX: 25.87 KG/M2 | WEIGHT: 146 LBS | RESPIRATION RATE: 16 BRPM | TEMPERATURE: 97.5 F | OXYGEN SATURATION: 97 %

## 2024-08-21 DIAGNOSIS — K30 INDIGESTION: ICD-10-CM

## 2024-08-21 DIAGNOSIS — J44.1 COPD EXACERBATION (MULTI): Primary | ICD-10-CM

## 2024-08-21 DIAGNOSIS — Z72.0 TOBACCO ABUSE: ICD-10-CM

## 2024-08-21 LAB
ALBUMIN SERPL BCP-MCNC: 4 G/DL (ref 3.4–5)
ALP SERPL-CCNC: 132 U/L (ref 33–110)
ALT SERPL W P-5'-P-CCNC: 14 U/L (ref 7–45)
ANION GAP BLDV CALCULATED.4IONS-SCNC: 11 MMOL/L (ref 10–25)
ANION GAP SERPL CALC-SCNC: 14 MMOL/L (ref 10–20)
AST SERPL W P-5'-P-CCNC: 18 U/L (ref 9–39)
BASE EXCESS BLDV CALC-SCNC: -3.3 MMOL/L (ref -2–3)
BASOPHILS # BLD AUTO: 0.05 X10*3/UL (ref 0–0.1)
BASOPHILS NFR BLD AUTO: 0.8 %
BILIRUB SERPL-MCNC: 0.5 MG/DL (ref 0–1.2)
BODY TEMPERATURE: 37 DEGREES CELSIUS
BUN SERPL-MCNC: 9 MG/DL (ref 6–23)
CA-I BLDV-SCNC: 1.16 MMOL/L (ref 1.1–1.33)
CALCIUM SERPL-MCNC: 9.1 MG/DL (ref 8.6–10.6)
CARDIAC TROPONIN I PNL SERPL HS: <3 NG/L (ref 0–34)
CHLORIDE BLDV-SCNC: 105 MMOL/L (ref 98–107)
CHLORIDE SERPL-SCNC: 106 MMOL/L (ref 98–107)
CO2 SERPL-SCNC: 23 MMOL/L (ref 21–32)
CREAT SERPL-MCNC: 0.69 MG/DL (ref 0.5–1.05)
EGFRCR SERPLBLD CKD-EPI 2021: >90 ML/MIN/1.73M*2
EOSINOPHIL # BLD AUTO: 0.83 X10*3/UL (ref 0–0.7)
EOSINOPHIL NFR BLD AUTO: 13.6 %
ERYTHROCYTE [DISTWIDTH] IN BLOOD BY AUTOMATED COUNT: 12 % (ref 11.5–14.5)
GLUCOSE BLDV-MCNC: 129 MG/DL (ref 74–99)
GLUCOSE SERPL-MCNC: 120 MG/DL (ref 74–99)
HCO3 BLDV-SCNC: 22.1 MMOL/L (ref 22–26)
HCT VFR BLD AUTO: 42.4 % (ref 36–46)
HCT VFR BLD EST: 46 % (ref 36–46)
HGB BLD-MCNC: 14.7 G/DL (ref 12–16)
HGB BLDV-MCNC: 15.3 G/DL (ref 12–16)
IMM GRANULOCYTES # BLD AUTO: 0.01 X10*3/UL (ref 0–0.7)
IMM GRANULOCYTES NFR BLD AUTO: 0.2 % (ref 0–0.9)
INHALED O2 CONCENTRATION: 21 %
LACTATE BLDV-SCNC: 2.2 MMOL/L (ref 0.4–2)
LYMPHOCYTES # BLD AUTO: 2.6 X10*3/UL (ref 1.2–4.8)
LYMPHOCYTES NFR BLD AUTO: 42.6 %
MCH RBC QN AUTO: 30.2 PG (ref 26–34)
MCHC RBC AUTO-ENTMCNC: 34.7 G/DL (ref 32–36)
MCV RBC AUTO: 87 FL (ref 80–100)
MONOCYTES # BLD AUTO: 0.42 X10*3/UL (ref 0.1–1)
MONOCYTES NFR BLD AUTO: 6.9 %
NEUTROPHILS # BLD AUTO: 2.19 X10*3/UL (ref 1.2–7.7)
NEUTROPHILS NFR BLD AUTO: 35.9 %
NRBC BLD-RTO: 0 /100 WBCS (ref 0–0)
OXYHGB MFR BLDV: 94.3 % (ref 45–75)
PCO2 BLDV: 40 MM HG (ref 41–51)
PH BLDV: 7.35 PH (ref 7.33–7.43)
PLATELET # BLD AUTO: 250 X10*3/UL (ref 150–450)
PO2 BLDV: 66 MM HG (ref 35–45)
POTASSIUM BLDV-SCNC: 3.5 MMOL/L (ref 3.5–5.3)
POTASSIUM SERPL-SCNC: 3.9 MMOL/L (ref 3.5–5.3)
PROT SERPL-MCNC: 7.9 G/DL (ref 6.4–8.2)
RBC # BLD AUTO: 4.87 X10*6/UL (ref 4–5.2)
SAO2 % BLDV: 97 % (ref 45–75)
SODIUM BLDV-SCNC: 135 MMOL/L (ref 136–145)
SODIUM SERPL-SCNC: 139 MMOL/L (ref 136–145)
WBC # BLD AUTO: 6.1 X10*3/UL (ref 4.4–11.3)

## 2024-08-21 PROCEDURE — 93005 ELECTROCARDIOGRAM TRACING: CPT

## 2024-08-21 PROCEDURE — 71046 X-RAY EXAM CHEST 2 VIEWS: CPT | Performed by: STUDENT IN AN ORGANIZED HEALTH CARE EDUCATION/TRAINING PROGRAM

## 2024-08-21 PROCEDURE — 96374 THER/PROPH/DIAG INJ IV PUSH: CPT

## 2024-08-21 PROCEDURE — 84132 ASSAY OF SERUM POTASSIUM: CPT | Performed by: EMERGENCY MEDICINE

## 2024-08-21 PROCEDURE — 2500000002 HC RX 250 W HCPCS SELF ADMINISTERED DRUGS (ALT 637 FOR MEDICARE OP, ALT 636 FOR OP/ED): Mod: SE | Performed by: EMERGENCY MEDICINE

## 2024-08-21 PROCEDURE — 85025 COMPLETE CBC W/AUTO DIFF WBC: CPT | Performed by: EMERGENCY MEDICINE

## 2024-08-21 PROCEDURE — 2500000002 HC RX 250 W HCPCS SELF ADMINISTERED DRUGS (ALT 637 FOR MEDICARE OP, ALT 636 FOR OP/ED): Mod: SE

## 2024-08-21 PROCEDURE — 80053 COMPREHEN METABOLIC PANEL: CPT | Performed by: EMERGENCY MEDICINE

## 2024-08-21 PROCEDURE — 71046 X-RAY EXAM CHEST 2 VIEWS: CPT

## 2024-08-21 PROCEDURE — S4991 NICOTINE PATCH NONLEGEND: HCPCS | Mod: SE

## 2024-08-21 PROCEDURE — 36415 COLL VENOUS BLD VENIPUNCTURE: CPT | Performed by: EMERGENCY MEDICINE

## 2024-08-21 PROCEDURE — 2500000004 HC RX 250 GENERAL PHARMACY W/ HCPCS (ALT 636 FOR OP/ED): Mod: SE

## 2024-08-21 PROCEDURE — 94640 AIRWAY INHALATION TREATMENT: CPT

## 2024-08-21 PROCEDURE — 99284 EMERGENCY DEPT VISIT MOD MDM: CPT | Mod: 25

## 2024-08-21 PROCEDURE — 99285 EMERGENCY DEPT VISIT HI MDM: CPT | Mod: 25

## 2024-08-21 PROCEDURE — 84484 ASSAY OF TROPONIN QUANT: CPT | Performed by: EMERGENCY MEDICINE

## 2024-08-21 RX ORDER — PREDNISONE 20 MG/1
40 TABLET ORAL DAILY
Qty: 8 TABLET | Refills: 0 | Status: SHIPPED | OUTPATIENT
Start: 2024-08-22 | End: 2024-08-26

## 2024-08-21 RX ORDER — ONDANSETRON HYDROCHLORIDE 2 MG/ML
4 INJECTION, SOLUTION INTRAVENOUS ONCE
Status: COMPLETED | OUTPATIENT
Start: 2024-08-21 | End: 2024-08-21

## 2024-08-21 RX ORDER — PREDNISONE 20 MG/1
TABLET ORAL
Status: COMPLETED
Start: 2024-08-21 | End: 2024-08-21

## 2024-08-21 RX ORDER — IBUPROFEN 200 MG
1 TABLET ORAL EVERY 24 HOURS
Qty: 30 PATCH | Refills: 0 | Status: SHIPPED | OUTPATIENT
Start: 2024-08-21 | End: 2024-09-20

## 2024-08-21 RX ORDER — IPRATROPIUM BROMIDE AND ALBUTEROL SULFATE 2.5; .5 MG/3ML; MG/3ML
3 SOLUTION RESPIRATORY (INHALATION) EVERY 20 MIN
Status: COMPLETED | OUTPATIENT
Start: 2024-08-21 | End: 2024-08-21

## 2024-08-21 RX ORDER — FAMOTIDINE 20 MG/1
20 TABLET, FILM COATED ORAL DAILY
Qty: 30 TABLET | Refills: 0 | Status: SHIPPED | OUTPATIENT
Start: 2024-08-21 | End: 2024-09-20

## 2024-08-21 RX ORDER — BUDESONIDE AND FORMOTEROL FUMARATE DIHYDRATE 160; 4.5 UG/1; UG/1
1 AEROSOL RESPIRATORY (INHALATION) EVERY 6 HOURS PRN
Qty: 10.2 G | Refills: 0 | Status: SHIPPED | OUTPATIENT
Start: 2024-08-21 | End: 2025-08-21

## 2024-08-21 RX ORDER — ALBUTEROL SULFATE 0.83 MG/ML
SOLUTION RESPIRATORY (INHALATION)
Status: COMPLETED
Start: 2024-08-21 | End: 2024-08-21

## 2024-08-21 RX ORDER — IPRATROPIUM BROMIDE AND ALBUTEROL SULFATE 2.5; .5 MG/3ML; MG/3ML
SOLUTION RESPIRATORY (INHALATION)
Status: COMPLETED
Start: 2024-08-21 | End: 2024-08-21

## 2024-08-21 RX ORDER — IBUPROFEN 200 MG
1 TABLET ORAL ONCE
Status: DISCONTINUED | OUTPATIENT
Start: 2024-08-21 | End: 2024-08-22 | Stop reason: HOSPADM

## 2024-08-21 RX ORDER — ALBUTEROL SULFATE 0.83 MG/ML
2.5 SOLUTION RESPIRATORY (INHALATION) EVERY 4 HOURS PRN
Qty: 300 ML | Refills: 0 | Status: SHIPPED | OUTPATIENT
Start: 2024-08-21 | End: 2024-09-20

## 2024-08-21 RX ORDER — ALBUTEROL SULFATE 0.83 MG/ML
2.5 SOLUTION RESPIRATORY (INHALATION) CONTINUOUS
Status: ACTIVE | OUTPATIENT
Start: 2024-08-21 | End: 2024-08-21

## 2024-08-21 RX ORDER — ONDANSETRON HYDROCHLORIDE 2 MG/ML
INJECTION, SOLUTION INTRAVENOUS
Status: COMPLETED
Start: 2024-08-21 | End: 2024-08-21

## 2024-08-21 RX ORDER — PREDNISONE 20 MG/1
40 TABLET ORAL ONCE
Status: COMPLETED | OUTPATIENT
Start: 2024-08-21 | End: 2024-08-21

## 2024-08-21 ASSESSMENT — LIFESTYLE VARIABLES
HAVE YOU EVER FELT YOU SHOULD CUT DOWN ON YOUR DRINKING: NO
HAVE YOU EVER FELT YOU SHOULD CUT DOWN ON YOUR DRINKING: NO
EVER FELT BAD OR GUILTY ABOUT YOUR DRINKING: NO
HAVE PEOPLE ANNOYED YOU BY CRITICIZING YOUR DRINKING: NO
EVER HAD A DRINK FIRST THING IN THE MORNING TO STEADY YOUR NERVES TO GET RID OF A HANGOVER: NO
TOTAL SCORE: 0
TOTAL SCORE: 0
HAVE PEOPLE ANNOYED YOU BY CRITICIZING YOUR DRINKING: NO
EVER HAD A DRINK FIRST THING IN THE MORNING TO STEADY YOUR NERVES TO GET RID OF A HANGOVER: NO
EVER FELT BAD OR GUILTY ABOUT YOUR DRINKING: NO

## 2024-08-21 ASSESSMENT — COLUMBIA-SUICIDE SEVERITY RATING SCALE - C-SSRS
2. HAVE YOU ACTUALLY HAD ANY THOUGHTS OF KILLING YOURSELF?: NO
2. HAVE YOU ACTUALLY HAD ANY THOUGHTS OF KILLING YOURSELF?: NO
6. HAVE YOU EVER DONE ANYTHING, STARTED TO DO ANYTHING, OR PREPARED TO DO ANYTHING TO END YOUR LIFE?: NO
6. HAVE YOU EVER DONE ANYTHING, STARTED TO DO ANYTHING, OR PREPARED TO DO ANYTHING TO END YOUR LIFE?: NO
1. IN THE PAST MONTH, HAVE YOU WISHED YOU WERE DEAD OR WISHED YOU COULD GO TO SLEEP AND NOT WAKE UP?: NO
1. IN THE PAST MONTH, HAVE YOU WISHED YOU WERE DEAD OR WISHED YOU COULD GO TO SLEEP AND NOT WAKE UP?: NO

## 2024-08-21 ASSESSMENT — PAIN SCALES - GENERAL
PAINLEVEL_OUTOF10: 0 - NO PAIN

## 2024-08-21 ASSESSMENT — PAIN - FUNCTIONAL ASSESSMENT
PAIN_FUNCTIONAL_ASSESSMENT: 0-10
PAIN_FUNCTIONAL_ASSESSMENT: 0-10

## 2024-08-21 NOTE — ED TRIAGE NOTES
Pt reports SOB since Saturday. Pt is currently at a treatment center, in treatment for crack and marijuana. Pt reports she does smoke 2 cigarettes a day. Pt has COPD. Reports inhaler isn't working

## 2024-08-21 NOTE — ED PROVIDER NOTES
Emergency Department Provider Note        History of Present Illness     History provided by: Patient  Limitations to History: None  External Records Reviewed with Brief Summary: None    HPI:  Sylvia Archuleta is a 59 y.o. female with a history of COPD who presents to the ED for shortness of breath.  Was recently in the ED for similar symptoms.  She reports this morning she was so short of breath that she could not take a shower.  She reports she has been using her rescue inhaler more often.  She developed chest pain today.  It is in the center of her chest.  Does not radiate.  Reports that she was given a medication that the powder that she believes does not work.    Physical Exam   Triage vitals:  T    HR    BP    RR    O2        General: Awake, alert, in no acute distress  Eyes: Gaze conjugate.  No scleral icterus or injection  HENT: Normo-cephalic, atraumatic. No stridor  CV: Regular rate, regular rhythm. Radial pulses 2+ bilaterally  Resp: Diffuse bilateral wheezing on exertion.  Speaking in short sentences.  GI: Soft, non-distended, non-tender. No rebound or guarding.  : Deferred  MSK/Extremities: No gross bony deformities. Moving all extremities  Skin: Warm. Appropriate color  Neuro: Alert. Oriented. Face symmetric. Speech is fluent.  Gross strength and sensation intact in b/l UE and LEs  Psych: Appropriate mood and affect    Medical Decision Making & ED Course   Medical Decision Makin y.o. female presents to the ED for shortness of breath.  She has a history of COPD.  Upon arrival to the ED she is tachypneic.  Has diffuse bilateral expiratory wheezing.  Otherwise vital signs within normal limits.  On room air.  Was given 3 DuoNeb treatments as well as oral steroids.  X-ray without consolidation.  Not acidotic.  Rest of laboratory work including troponin within normal limits.  EKG appears stable from baseline.  Very low suspicion for acute coronary syndrome.  Upon reevaluation patient felt improved.   She is requesting a prescription for nebulizer machine was provided.  Also prescribed several day course of prednisone.  Patient was instructed to return to the ED with any worsening symptoms or concerns.  All questions were answered.  ----      Differential diagnoses considered include but are not limited to: COPD, ACS     Social Determinants of Health which Significantly Impact Care: None identified     EKG Independent Interpretation:  Sinus rhythm rate 89.  No ST segment elevations or depressions.  No T wave abnormalities.  Normal intervals normal axis unchanged from prior EKG    Independent Result Review and Interpretation: Relevant laboratory and radiographic results were reviewed and independently interpreted by myself.  As necessary, they are commented on in the ED Course.    Chronic conditions affecting the patient's care: As documented above in [facility]    The patient was discussed with the following consultants/services: None    Care Considerations: As documented above in [facility]    ED Course:  Diagnoses as of 08/21/24 1255   COPD exacerbation (Multi)     Disposition   As a result of the work-up, the patient was discharged home.  she was informed of her diagnosis and instructed to come back with any concerns or worsening of condition.  she and was agreeable to the plan as discussed above.  she was given the opportunity to ask questions.  All of the patient's questions were answered.    Procedures   Procedures    Patient was seen independently    Guilherme Rajan MD  Emergency Medicine     Guilherme Rajan MD  08/21/24 7529

## 2024-08-21 NOTE — Clinical Note
Sylvia Archuleta was seen and treated in our emergency department on 8/21/2024.  She may return to work on 08/23/2024.       If you have any questions or concerns, please don't hesitate to call.      Pa Park MD

## 2024-08-22 LAB
ATRIAL RATE: 89 BPM
P AXIS: 33 DEGREES
P OFFSET: 211 MS
P ONSET: 169 MS
PR INTERVAL: 112 MS
Q ONSET: 225 MS
QRS COUNT: 15 BEATS
QRS DURATION: 78 MS
QT INTERVAL: 368 MS
QTC CALCULATION(BAZETT): 447 MS
QTC FREDERICIA: 419 MS
R AXIS: 62 DEGREES
T AXIS: 65 DEGREES
T OFFSET: 409 MS
VENTRICULAR RATE: 89 BPM

## 2024-08-22 NOTE — DISCHARGE INSTRUCTIONS
We would recommend continuing the steroids and nebulizer even prescribed.  We also think it is reasonable to go back to the combination inhaled albuterol and steroid combination as both your daily maintenance and rescue treatment as you have done in the past.  We have prescribed Symbicort for this purpose again.  Otherwise for ingestion it may be reasonable to trial famotidine.  Other medications at home that may help are Mylanta, omeprazole.

## 2024-08-22 NOTE — ED PROVIDER NOTES
HPI   Chief Complaint   Patient presents with    Shortness of Breath       Patient is a 59-year-old female with past medical history of cocaine abuse, COPD who presents with concern for continued shortness of breath.  Reportedly seen earlier today and sent home with albuterol and prednisone.  Endorses that the [facility] was unable to fill her medications at home tomorrow afternoon and she has become increasingly short of breath.  Endorses she is becoming short of breath even walking to the bathroom and back.  Endorses no other new complaints.  Reports she should be fine if she just gets her steroids and nebulizers.            Patient History   Past Medical History:   Diagnosis Date    Personal history of malignant neoplasm of bladder 02/29/2016    History of malignant neoplasm of bladder    Personal history of nicotine dependence 05/07/2019    History of nicotine dependence    Personal history of other diseases of the female genital tract 05/07/2019    History of breast pain    Personal history of other diseases of the musculoskeletal system and connective tissue 09/07/2017    History of muscle pain    Personal history of other specified conditions 05/07/2019    History of insomnia    Personal history of other specified conditions 04/18/2017    History of insomnia    Polyneuropathy, unspecified 11/29/2022    Neuropathy    Unspecified convulsions (Multi) 12/13/2015    Seizure     Past Surgical History:   Procedure Laterality Date    BLADDER SURGERY  02/12/2014    Bladder Surgery    CARPAL TUNNEL RELEASE  03/27/2014    Neuroplasty Decompression Median Nerve At Carpal Tunnel    OTHER SURGICAL HISTORY  02/04/2019    Laparotomy     Family History   Problem Relation Name Age of Onset    No Known Problems Mother      No Known Problems Father       Social History     Tobacco Use    Smoking status: Every Day     Current packs/day: 0.10     Types: Cigarettes    Smokeless tobacco: Not on file  "  Substance Use Topics    Alcohol use: Not on file    Drug use: Not Currently     Types: \"Crack\" cocaine, Marijuana       Physical Exam   ED Triage Vitals [08/21/24 2059]   Temperature Heart Rate Respirations BP   36.6 °C (97.9 °F) (!) 110 (!) 24 121/90      Pulse Ox Temp Source Heart Rate Source Patient Position   (!) 93 % Oral Monitor Sitting      BP Location FiO2 (%)     Right arm 21 %       Physical Exam  Constitutional:       Appearance: Normal appearance.   HENT:      Head: Normocephalic and atraumatic.   Eyes:      Extraocular Movements: Extraocular movements intact.   Cardiovascular:      Rate and Rhythm: Normal rate.   Pulmonary:      Comments: Mild tachypnea, satting low 90% on room air.  Reduced aeration all fields with no focal findings.  Diffuse expiratory wheeze.  Musculoskeletal:         General: Normal range of motion.      Cervical back: Normal range of motion.   Skin:     General: Skin is warm and dry.   Neurological:      General: No focal deficit present.      Mental Status: She is alert and oriented to person, place, and time.   Psychiatric:         Mood and Affect: Mood normal.         Behavior: Behavior normal.           ED Course & MDM   Diagnoses as of 08/22/24 1229   COPD exacerbation (Multi)   Indigestion   Tobacco abuse                 No data recorded     Oxford Coma Scale Score: 15 (08/21/24 2101 : Jaci Bernabe RN)                           Medical Decision Making  Patient is a 59-year-old female with past medical history of cocaine abuse, COPD who presents with concern for continued shortness of breath.  Exam continues to be most consistent with COPD exacerbation with suspected etiology being patient's inability to  her home medications.  Patient otherwise continues to not have any significant infectious symptoms and chest x-ray from earlier in the day with no evidence of infection.  No recent cocaine use, no chest pain or other significant cardiac symptoms and benign " cardiovascular workup earlier in the day otherwise.  Patient treated with additional DuoNebs and additional dose of steroids in ED with reported improvement in symptoms.  CDU admission discussed with patient but patient reports that she will be able to  her meds tomorrow afternoon at her detox facility and endorses that she think she will be okay with her albuterol rescue inhaler that she was provided earlier until then.  Additionally refilled Symbicort as rescue therapy.  Nicotine patches additionally provided patient per her request as she is trying to quit smoking.  Otherwise endorsing indigestion and offered famotidine to trial for this.  On reexamination prior to discharge, satting appropriately on room air, no increased work of breathing, ambulates without hypoxia.  Return precautions discussed with patient and patient discharged back to detox facility.    Patient seen and discussed with Dr. Jacoby Park MD, PhD  Emergency Medicine PGY3          Procedure  Procedures     Pa Park MD  Resident  08/22/24 3268

## 2024-08-22 NOTE — ED TRIAGE NOTES
Pt returns to the ED with continued SOB/wheezing. Pt was seen and evaluated this morning for the same complaint- was discharged with prescriptions that cannot be filled until tomorrow.     Pt currently in a treatment center for crack and marijuana- last used 15 days ago. Hx of COPD.     Given 1 duoneb en route by [facility].

## 2024-08-29 ENCOUNTER — APPOINTMENT (OUTPATIENT)
Dept: PRIMARY CARE | Facility: CLINIC | Age: 59
End: 2024-08-29
Payer: COMMERCIAL

## 2024-08-29 VITALS
OXYGEN SATURATION: 96 % | WEIGHT: 155 LBS | SYSTOLIC BLOOD PRESSURE: 127 MMHG | RESPIRATION RATE: 14 BRPM | DIASTOLIC BLOOD PRESSURE: 80 MMHG | BODY MASS INDEX: 27.46 KG/M2 | HEART RATE: 88 BPM

## 2024-08-29 DIAGNOSIS — F41.9 ANXIETY: ICD-10-CM

## 2024-08-29 DIAGNOSIS — E55.9 VITAMIN D DEFICIENCY: ICD-10-CM

## 2024-08-29 DIAGNOSIS — M54.50 ACUTE LOW BACK PAIN, UNSPECIFIED BACK PAIN LATERALITY, UNSPECIFIED WHETHER SCIATICA PRESENT: ICD-10-CM

## 2024-08-29 DIAGNOSIS — K21.9 GASTROESOPHAGEAL REFLUX DISEASE, UNSPECIFIED WHETHER ESOPHAGITIS PRESENT: ICD-10-CM

## 2024-08-29 DIAGNOSIS — J44.1 COPD EXACERBATION (MULTI): ICD-10-CM

## 2024-08-29 DIAGNOSIS — J44.9 CHRONIC OBSTRUCTIVE PULMONARY DISEASE, UNSPECIFIED COPD TYPE (MULTI): ICD-10-CM

## 2024-08-29 PROBLEM — H05.019 CELLULITIS OF ORBIT: Status: ACTIVE | Noted: 2024-08-29

## 2024-08-29 PROBLEM — M11.20 CALCIUM PYROPHOSPHATE DEPOSITION DISEASE (CPPD): Status: ACTIVE | Noted: 2024-08-29

## 2024-08-29 PROBLEM — R11.0 NAUSEA: Status: ACTIVE | Noted: 2024-08-29

## 2024-08-29 PROBLEM — F25.9 SCHIZOAFFECTIVE DISORDER (MULTI): Status: ACTIVE | Noted: 2024-04-08

## 2024-08-29 PROBLEM — M06.9 FLARE OF RHEUMATOID ARTHRITIS (MULTI): Status: ACTIVE | Noted: 2024-08-29

## 2024-08-29 PROBLEM — R70.0 ELEVATED ERYTHROCYTE SEDIMENTATION RATE: Status: ACTIVE | Noted: 2024-08-29

## 2024-08-29 PROBLEM — M71.20 SYNOVIAL CYST OF KNEE: Status: ACTIVE | Noted: 2024-08-29

## 2024-08-29 PROBLEM — M25.50 ARTHRALGIA OF MULTIPLE JOINTS: Status: ACTIVE | Noted: 2024-08-29

## 2024-08-29 PROBLEM — R56.9 SEIZURE (MULTI): Status: ACTIVE | Noted: 2024-08-29

## 2024-08-29 PROBLEM — E88.09 HYPERPROTEINEMIA: Status: ACTIVE | Noted: 2024-08-29

## 2024-08-29 PROCEDURE — 99214 OFFICE O/P EST MOD 30 MIN: CPT | Performed by: INTERNAL MEDICINE

## 2024-08-29 RX ORDER — ALBUTEROL SULFATE 90 UG/1
2 INHALANT RESPIRATORY (INHALATION)
Qty: 18 G | Refills: 3 | Status: SHIPPED | OUTPATIENT
Start: 2024-08-29

## 2024-08-29 RX ORDER — IPRATROPIUM BROMIDE AND ALBUTEROL SULFATE 2.5; .5 MG/3ML; MG/3ML
3 SOLUTION RESPIRATORY (INHALATION) 4 TIMES DAILY PRN
Qty: 360 ML | Refills: 11 | Status: SHIPPED | OUTPATIENT
Start: 2024-08-29 | End: 2025-08-29

## 2024-08-29 RX ORDER — VIT C/E/ZN/COPPR/LUTEIN/ZEAXAN 250MG-90MG
25 CAPSULE ORAL DAILY
Qty: 90 CAPSULE | Refills: 3 | Status: SHIPPED | OUTPATIENT
Start: 2024-08-29

## 2024-08-29 RX ORDER — MELOXICAM 7.5 MG/1
7.5 TABLET ORAL DAILY
Qty: 30 TABLET | Refills: 11 | Status: SHIPPED | OUTPATIENT
Start: 2024-08-29 | End: 2025-08-29

## 2024-08-29 RX ORDER — BUDESONIDE AND FORMOTEROL FUMARATE DIHYDRATE 160; 4.5 UG/1; UG/1
1 AEROSOL RESPIRATORY (INHALATION) EVERY 6 HOURS PRN
Qty: 10.2 G | Refills: 0 | Status: SHIPPED | OUTPATIENT
Start: 2024-08-29 | End: 2025-08-29

## 2024-08-29 RX ORDER — QUETIAPINE FUMARATE 50 MG/1
75 TABLET, FILM COATED ORAL NIGHTLY
Qty: 180 TABLET | Refills: 2 | Status: SHIPPED | OUTPATIENT
Start: 2024-08-29

## 2024-08-29 RX ORDER — OMEPRAZOLE 40 MG/1
40 CAPSULE, DELAYED RELEASE ORAL
Qty: 90 CAPSULE | Refills: 3 | Status: SHIPPED | OUTPATIENT
Start: 2024-08-29

## 2024-08-29 ASSESSMENT — ENCOUNTER SYMPTOMS
LOSS OF SENSATION IN FEET: 0
DEPRESSION: 0
OCCASIONAL FEELINGS OF UNSTEADINESS: 0

## 2024-08-29 ASSESSMENT — PAIN SCALES - GENERAL: PAINLEVEL: 0-NO PAIN

## 2024-08-29 NOTE — ASSESSMENT & PLAN NOTE
Patient cut down smoking 2 cigarettes a day.  Prescription for nebulizer and  Aerosol treatment with the DuoNeb.

## 2024-08-29 NOTE — PROGRESS NOTES
Subjective   Chief complaint: Sylvia Archuleta is a 59 y.o. female who presents for COPD (Pt is being seen for SOB with her COPD) and Leg Swelling (Pt c/o edema and pain in legs bilaterally x 1 year off and on).    HPI:  59 years old with drug abuse who is now in the rehab and he has not done any drug and it is an inpatient rehab she is in there she came today because she has several issues she has increased heartburn with a history of GERD.  History of asthma she need the treatment for her asthma she normally taken aerosol treatment with the albuterol.  Also she is suffering from severe insomnia  She had a chronic psychiatric problems.        Objective   /80   Pulse 88   Resp 14   Wt 70.3 kg (155 lb)   SpO2 96%   BMI 27.46 kg/m²   Physical Exam  Vitals reviewed.   Constitutional:       Appearance: Normal appearance.   HENT:      Head: Normocephalic and atraumatic.   Cardiovascular:      Rate and Rhythm: Normal rate and regular rhythm.   Pulmonary:      Effort: Pulmonary effort is normal.      Breath sounds: Normal breath sounds.   Abdominal:      General: Bowel sounds are normal.      Palpations: Abdomen is soft.   Musculoskeletal:      Cervical back: Neck supple.   Skin:     General: Skin is warm and dry.   Neurological:      General: No focal deficit present.      Mental Status: She is alert.   Psychiatric:         Mood and Affect: Mood normal.         Behavior: Behavior is cooperative.         I have reviewed and reconciled the medication list with the patient today.   Current Outpatient Medications:     albuterol (ProAir HFA) 90 mcg/actuation inhaler, INHALE 1 TO 2 PUFFS EVERY 4 TO 6 HOURS AS NEEDED., Disp: , Rfl:     albuterol 2.5 mg /3 mL (0.083 %) nebulizer solution, Take 3 mL (2.5 mg) by nebulization every 4 hours if needed for wheezing., Disp: 300 mL, Rfl: 0    amitriptyline (Elavil) 10 mg tablet, TAKE 1 TABLET AT BEDTIME., Disp: , Rfl:     ARIPiprazole (Abilify) 5 mg tablet, TAKE 1 TABLET DAILY.,  Disp: , Rfl:     baclofen (Lioresal) 20 mg tablet, TAKE 1 TABLET BY MOUTH 3 TO 4 TIMES DAILY, Disp: , Rfl:     budesonide-formoteroL (Symbicort) 160-4.5 mcg/actuation inhaler, Inhale 1 puff every 6 hours if needed (shortness of breath or wheeze)., Disp: 10.2 g, Rfl: 0    colchicine 0.6 mg tablet, as directed, Disp: , Rfl:     cyclobenzaprine (Flexeril) 5 mg tablet, TAKE 1 TABLET AT BEDTIME AS NEEDED., Disp: , Rfl:     escitalopram (Lexapro) 10 mg tablet, TAKE 1 TABLET BY MOUTH EVERY DAY, Disp: , Rfl:     famotidine (Pepcid) 20 mg tablet, Take 1 tablet (20 mg) by mouth once daily., Disp: 30 tablet, Rfl: 0    fluticasone (Flonase) 50 mcg/actuation nasal spray, USE 2 SPRAYS IN EACH NOSTRIL ONCE A DAY, Disp: , Rfl:     gabapentin (Neurontin) 400 mg capsule, TAKE 2 CAPSULE 3 times daily, Disp: , Rfl:     naproxen (Naprosyn) 500 mg tablet, take 1 tablet by mouth every 12 hours if needed for pain and SWELLING, Disp: , Rfl:     nicotine (Nicoderm CQ) 14 mg/24 hr patch, Place 1 patch over 24 hours on the skin once every 24 hours., Disp: 30 patch, Rfl: 0    orphenadrine (Norflex) 100 mg 12 hr tablet, TAKE 1 TABLET BY MOUTH TWICE A DAY AS NEEDED, Disp: , Rfl:     oxyCODONE-acetaminophen (Percocet) 5-325 mg tablet, TAKE 1 TABLET BY MOUTH EVERY 6 HOURS FOR 3 DAYS AS NEEDED, Disp: , Rfl:     Rexulti 1 mg tablet, TAKE 1 TABLET BY MOUTH EVERY DAY, Disp: , Rfl:     traZODone (Desyrel) 50 mg tablet, , Disp: , Rfl:     Trintellix 10 mg tablet tablet, TAKE 1 TABLET BY MOUTH EVERY DAY, Disp: , Rfl:     acetaminophen-codeine (Tylenol w/ Codeine #3) 300-30 mg tablet, take 1 tablet by mouth every 4 hours for 3 days, Disp: , Rfl:      Imaging:  ECG 12 lead    Result Date: 8/22/2024  Normal sinus rhythm Normal ECG When compared with ECG of 07-AUG-2024 12:28, Nonspecific T wave abnormality now evident in Lateral leads See ED provider note for full interpretation and clinical correlation Confirmed by Mati Xiong ([id number]) on 8/22/2024  12:52:19 AM    XR chest 2 views    Result Date: 8/21/2024  Interpreted By:  Stevenson Frost, STUDY: XR CHEST 2 VIEWS;  8/21/2024 8:32 am   INDICATION: Signs/Symptoms:cp.   COMPARISON: None.   ACCESSION NUMBER(S): [id number]   ORDERING CLINICIAN: JONNATHAN ANGULO   FINDINGS: AP radiograph of the chest was provided.     CARDIOMEDIASTINAL SILHOUETTE: Cardiomediastinal silhouette is normal in size and configuration.   LUNGS: Lungs are clear.   ABDOMEN: No remarkable upper abdominal findings.   BONES: No acute osseous changes.       1.  No evidence of acute cardiopulmonary process.       MACRO: None   Signed by: Stevenson Frost 8/21/2024 8:54 AM Dictation workstation:   [id number]    XR chest 2 views    Result Date: 8/17/2024  Interpreted By:  Zeina Ch and Benza Andrew STUDY: XR CHEST 2 VIEWS;  8/17/2024 9:29 am   INDICATION: Signs/Symptoms:SOB.   COMPARISON: Chest radiograph dated 06/14/2023   ACCESSION NUMBER(S): [id number]   ORDERING CLINICIAN: CARIDAD VALDES   FINDINGS: PA and lateral radiographs of the chest were provided.   CARDIOMEDIASTINAL SILHOUETTE: Cardiomediastinal silhouette is normal in size and configuration.   LUNGS: Lungs are clear. No focal consolidation, pleural effusion, or pneumothorax.   ABDOMEN: No remarkable upper abdominal findings.   BONES: No acute osseous changes. Degenerative changes of the right-greater-than-left glenohumeral joints.       No acute cardiopulmonary process.   I personally reviewed the images/study and I agree with the findings as stated above by resident physician, Vasu Joe MD. This study was interpreted at [place], [place].   MACRO: None.   Signed by: Zeina Ch 8/17/2024 9:55 AM Dictation workstation:   [id number]    Electrocardiogram, 12-lead    Result Date: 8/7/2024  Sinus rhythm with short NM Otherwise normal ECG When compared with ECG of 14-JUN-2023 13:55, Previous ECG has undetermined rhythm, needs review See  ED provider note for full interpretation and clinical correlation Confirmed by Jessica Anne ([id number]) on 8/7/2024 11:47:58 PM       Labs reviewed:    Lab Results   Component Value Date    WBC 6.1 08/21/2024    HGB 14.7 08/21/2024    HCT 42.4 08/21/2024     08/21/2024    CHOL 215 (H) 01/22/2020    TRIG 152 (H) 01/22/2020    HDL 84.5 01/22/2020    ALT 14 08/21/2024    AST 18 08/21/2024     08/21/2024    K 3.9 08/21/2024     08/21/2024    CREATININE 0.69 08/21/2024    BUN 9 08/21/2024    CO2 23 08/21/2024    TSH 1.10 10/22/2021    INR 1.0 05/29/2023    HGBA1C 5.7 03/05/2018       Assessment/Plan   Problem List Items Addressed This Visit       Anxiety     Increase Seroquel         Back pain, acute     Meloxicam         Chronic obstructive pulmonary disease (Multi)     Patient cut down smoking 2 cigarettes a day.  Prescription for nebulizer and  Aerosol treatment with the DuoNeb.         GERD (gastroesophageal reflux disease)     Omeprazole         Vitamin D deficiency     Vitamin D vitamin D supplement          Other Visit Diagnoses       COPD exacerbation (Multi)                Continue current medications as listed  Follow up in 2 months for complete physical

## 2024-08-30 ENCOUNTER — TELEPHONE (OUTPATIENT)
Dept: PRIMARY CARE | Facility: CLINIC | Age: 59
End: 2024-08-30
Payer: COMMERCIAL

## 2024-08-30 DIAGNOSIS — J45.909 MILD ASTHMA, UNSPECIFIED WHETHER COMPLICATED, UNSPECIFIED WHETHER PERSISTENT (HHS-HCC): Primary | ICD-10-CM

## 2024-09-01 RX ORDER — METHYLPREDNISOLONE 4 MG/1
TABLET ORAL
Qty: 21 TABLET | Refills: 0 | Status: SHIPPED | OUTPATIENT
Start: 2024-09-01 | End: 2024-09-06

## 2024-11-20 ENCOUNTER — HOSPITAL ENCOUNTER (EMERGENCY)
Facility: HOSPITAL | Age: 59
Discharge: OTHER INSTITUTION | End: 2024-11-20
Attending: EMERGENCY MEDICINE
Payer: COMMERCIAL

## 2024-11-20 ENCOUNTER — CLINICAL SUPPORT (OUTPATIENT)
Dept: EMERGENCY MEDICINE | Facility: HOSPITAL | Age: 59
End: 2024-11-20
Payer: COMMERCIAL

## 2024-11-20 VITALS
WEIGHT: 164 LBS | RESPIRATION RATE: 16 BRPM | HEIGHT: 63 IN | BODY MASS INDEX: 29.06 KG/M2 | SYSTOLIC BLOOD PRESSURE: 162 MMHG | TEMPERATURE: 95.5 F | OXYGEN SATURATION: 98 % | DIASTOLIC BLOOD PRESSURE: 79 MMHG | HEART RATE: 79 BPM

## 2024-11-20 DIAGNOSIS — F20.9 SCHIZOPHRENIA, UNSPECIFIED TYPE: Primary | ICD-10-CM

## 2024-11-20 DIAGNOSIS — R44.3 HALLUCINATIONS: ICD-10-CM

## 2024-11-20 DIAGNOSIS — F19.10 POLYSUBSTANCE ABUSE (MULTI): ICD-10-CM

## 2024-11-20 LAB
ALBUMIN SERPL BCP-MCNC: 4.8 G/DL (ref 3.4–5)
ALP SERPL-CCNC: 110 U/L (ref 33–110)
ALT SERPL W P-5'-P-CCNC: 22 U/L (ref 7–45)
AMPHETAMINES UR QL SCN: ABNORMAL
ANION GAP SERPL CALC-SCNC: 13 MMOL/L (ref 10–20)
APAP SERPL-MCNC: <10 UG/ML
APPEARANCE UR: CLEAR
AST SERPL W P-5'-P-CCNC: 23 U/L (ref 9–39)
ATRIAL RATE: 66 BPM
BARBITURATES UR QL SCN: ABNORMAL
BASOPHILS # BLD AUTO: 0.03 X10*3/UL (ref 0–0.1)
BASOPHILS NFR BLD AUTO: 0.5 %
BENZODIAZ UR QL SCN: ABNORMAL
BILIRUB SERPL-MCNC: 0.5 MG/DL (ref 0–1.2)
BILIRUB UR STRIP.AUTO-MCNC: NEGATIVE MG/DL
BUN SERPL-MCNC: 19 MG/DL (ref 6–23)
BZE UR QL SCN: ABNORMAL
CALCIUM SERPL-MCNC: 9.5 MG/DL (ref 8.6–10.6)
CANNABINOIDS UR QL SCN: ABNORMAL
CHLORIDE SERPL-SCNC: 106 MMOL/L (ref 98–107)
CO2 SERPL-SCNC: 25 MMOL/L (ref 21–32)
COLOR UR: ABNORMAL
CREAT SERPL-MCNC: 0.78 MG/DL (ref 0.5–1.05)
EGFRCR SERPLBLD CKD-EPI 2021: 88 ML/MIN/1.73M*2
EOSINOPHIL # BLD AUTO: 0.09 X10*3/UL (ref 0–0.7)
EOSINOPHIL NFR BLD AUTO: 1.4 %
ERYTHROCYTE [DISTWIDTH] IN BLOOD BY AUTOMATED COUNT: 12.6 % (ref 11.5–14.5)
ETHANOL SERPL-MCNC: <10 MG/DL
FENTANYL+NORFENTANYL UR QL SCN: ABNORMAL
GLUCOSE SERPL-MCNC: 89 MG/DL (ref 74–99)
GLUCOSE UR STRIP.AUTO-MCNC: NORMAL MG/DL
HCT VFR BLD AUTO: 48.5 % (ref 36–46)
HGB BLD-MCNC: 16.2 G/DL (ref 12–16)
HOLD SPECIMEN: NORMAL
IMM GRANULOCYTES # BLD AUTO: 0.01 X10*3/UL (ref 0–0.7)
IMM GRANULOCYTES NFR BLD AUTO: 0.2 % (ref 0–0.9)
KETONES UR STRIP.AUTO-MCNC: NEGATIVE MG/DL
LEUKOCYTE ESTERASE UR QL STRIP.AUTO: ABNORMAL
LYMPHOCYTES # BLD AUTO: 3.09 X10*3/UL (ref 1.2–4.8)
LYMPHOCYTES NFR BLD AUTO: 47.7 %
MCH RBC QN AUTO: 30 PG (ref 26–34)
MCHC RBC AUTO-ENTMCNC: 33.4 G/DL (ref 32–36)
MCV RBC AUTO: 90 FL (ref 80–100)
METHADONE UR QL SCN: ABNORMAL
MONOCYTES # BLD AUTO: 0.41 X10*3/UL (ref 0.1–1)
MONOCYTES NFR BLD AUTO: 6.3 %
MUCOUS THREADS #/AREA URNS AUTO: ABNORMAL /LPF
NEUTROPHILS # BLD AUTO: 2.85 X10*3/UL (ref 1.2–7.7)
NEUTROPHILS NFR BLD AUTO: 43.9 %
NITRITE UR QL STRIP.AUTO: NEGATIVE
NRBC BLD-RTO: 0 /100 WBCS (ref 0–0)
OPIATES UR QL SCN: ABNORMAL
OXYCODONE+OXYMORPHONE UR QL SCN: ABNORMAL
P AXIS: 29 DEGREES
P OFFSET: 206 MS
P ONSET: 152 MS
PCP UR QL SCN: ABNORMAL
PH UR STRIP.AUTO: 5.5 [PH]
PLATELET # BLD AUTO: 250 X10*3/UL (ref 150–450)
POTASSIUM SERPL-SCNC: 4 MMOL/L (ref 3.5–5.3)
PR INTERVAL: 142 MS
PROT SERPL-MCNC: 7.9 G/DL (ref 6.4–8.2)
PROT UR STRIP.AUTO-MCNC: NEGATIVE MG/DL
Q ONSET: 223 MS
QRS COUNT: 11 BEATS
QRS DURATION: 82 MS
QT INTERVAL: 416 MS
QTC CALCULATION(BAZETT): 436 MS
QTC FREDERICIA: 429 MS
R AXIS: 32 DEGREES
RBC # BLD AUTO: 5.4 X10*6/UL (ref 4–5.2)
RBC # UR STRIP.AUTO: ABNORMAL /UL
RBC #/AREA URNS AUTO: ABNORMAL /HPF
SALICYLATES SERPL-MCNC: <3 MG/DL
SODIUM SERPL-SCNC: 140 MMOL/L (ref 136–145)
SP GR UR STRIP.AUTO: 1.03
SQUAMOUS #/AREA URNS AUTO: ABNORMAL /HPF
T AXIS: 29 DEGREES
T OFFSET: 431 MS
TSH SERPL-ACNC: 2.04 MIU/L (ref 0.44–3.98)
UROBILINOGEN UR STRIP.AUTO-MCNC: NORMAL MG/DL
VENTRICULAR RATE: 66 BPM
WBC # BLD AUTO: 6.5 X10*3/UL (ref 4.4–11.3)
WBC #/AREA URNS AUTO: ABNORMAL /HPF

## 2024-11-20 PROCEDURE — 99285 EMERGENCY DEPT VISIT HI MDM: CPT

## 2024-11-20 PROCEDURE — 80053 COMPREHEN METABOLIC PANEL: CPT | Performed by: PHYSICIAN ASSISTANT

## 2024-11-20 PROCEDURE — 84443 ASSAY THYROID STIM HORMONE: CPT | Performed by: PHYSICIAN ASSISTANT

## 2024-11-20 PROCEDURE — 80307 DRUG TEST PRSMV CHEM ANLYZR: CPT | Performed by: PHYSICIAN ASSISTANT

## 2024-11-20 PROCEDURE — 93010 ELECTROCARDIOGRAM REPORT: CPT | Performed by: PHYSICIAN ASSISTANT

## 2024-11-20 PROCEDURE — 99285 EMERGENCY DEPT VISIT HI MDM: CPT | Performed by: PHYSICIAN ASSISTANT

## 2024-11-20 PROCEDURE — 81001 URINALYSIS AUTO W/SCOPE: CPT | Mod: 59 | Performed by: PHYSICIAN ASSISTANT

## 2024-11-20 PROCEDURE — 85025 COMPLETE CBC W/AUTO DIFF WBC: CPT | Performed by: PHYSICIAN ASSISTANT

## 2024-11-20 PROCEDURE — 87086 URINE CULTURE/COLONY COUNT: CPT | Performed by: PHYSICIAN ASSISTANT

## 2024-11-20 PROCEDURE — 36415 COLL VENOUS BLD VENIPUNCTURE: CPT | Performed by: PHYSICIAN ASSISTANT

## 2024-11-20 PROCEDURE — 93005 ELECTROCARDIOGRAM TRACING: CPT

## 2024-11-20 PROCEDURE — 2500000001 HC RX 250 WO HCPCS SELF ADMINISTERED DRUGS (ALT 637 FOR MEDICARE OP): Mod: SE

## 2024-11-20 PROCEDURE — 80320 DRUG SCREEN QUANTALCOHOLS: CPT | Performed by: PHYSICIAN ASSISTANT

## 2024-11-20 PROCEDURE — 2500000001 HC RX 250 WO HCPCS SELF ADMINISTERED DRUGS (ALT 637 FOR MEDICARE OP): Mod: SE | Performed by: PHYSICIAN ASSISTANT

## 2024-11-20 RX ORDER — QUETIAPINE FUMARATE 50 MG/1
50 TABLET, FILM COATED ORAL NIGHTLY
Status: DISCONTINUED | OUTPATIENT
Start: 2024-11-20 | End: 2024-11-20 | Stop reason: HOSPADM

## 2024-11-20 RX ORDER — CEPHALEXIN 250 MG/1
500 CAPSULE ORAL ONCE
Status: COMPLETED | OUTPATIENT
Start: 2024-11-20 | End: 2024-11-20

## 2024-11-20 RX ORDER — ARIPIPRAZOLE 5 MG/1
5 TABLET ORAL DAILY
Status: DISCONTINUED | OUTPATIENT
Start: 2024-11-20 | End: 2024-11-20 | Stop reason: HOSPADM

## 2024-11-20 SDOH — HEALTH STABILITY: MENTAL HEALTH: SLEEP PATTERN: DIFFICULTY FALLING ASLEEP;INSOMNIA

## 2024-11-20 SDOH — HEALTH STABILITY: MENTAL HEALTH: BEHAVIORS/MOOD: ANXIOUS

## 2024-11-20 SDOH — HEALTH STABILITY: MENTAL HEALTH: SUICIDAL BEHAVIOR (3 MONTHS): NO

## 2024-11-20 SDOH — HEALTH STABILITY: MENTAL HEALTH: SUICIDAL BEHAVIOR (LIFETIME): YES

## 2024-11-20 SDOH — HEALTH STABILITY: MENTAL HEALTH: WHEN DID YOU TRY TO KILL YOURSELF?: 2023

## 2024-11-20 SDOH — HEALTH STABILITY: MENTAL HEALTH: WISH TO BE DEAD (PAST 1 MONTH): NO

## 2024-11-20 SDOH — HEALTH STABILITY: MENTAL HEALTH: HAVE YOU ACTUALLY HAD ANY THOUGHTS OF KILLING YOURSELF?: YES

## 2024-11-20 SDOH — HEALTH STABILITY: MENTAL HEALTH: NEEDS EXPRESSED: EMOTIONAL;SPIRITUAL

## 2024-11-20 SDOH — HEALTH STABILITY: MENTAL HEALTH: IN THE PAST WEEK, HAVE YOU BEEN HAVING THOUGHTS ABOUT KILLING YOURSELF?: NO

## 2024-11-20 SDOH — HEALTH STABILITY: MENTAL HEALTH: ARE YOU HAVING THOUGHTS OF KILLING YOURSELF RIGHT NOW?: NO

## 2024-11-20 SDOH — HEALTH STABILITY: MENTAL HEALTH: HAVE YOU EVER DONE ANYTHING, STARTED TO DO ANYTHING, OR PREPARED TO DO ANYTHING TO END YOUR LIFE?: NO

## 2024-11-20 SDOH — HEALTH STABILITY: MENTAL HEALTH: BEHAVIORAL HEALTH(WDL): EXCEPTIONS TO WDL

## 2024-11-20 SDOH — HEALTH STABILITY: MENTAL HEALTH: HAVE YOU EVER TRIED TO KILL YOURSELF?: YES

## 2024-11-20 SDOH — HEALTH STABILITY: MENTAL HEALTH: HALLUCINATION: AUDITORY;COMMAND

## 2024-11-20 SDOH — HEALTH STABILITY: MENTAL HEALTH: HAVE YOU HAD THESE THOUGHTS AND HAD SOME INTENTION OF ACTING ON THEM?: NO

## 2024-11-20 SDOH — HEALTH STABILITY: MENTAL HEALTH: ANXIETY SYMPTOMS: GENERALIZED

## 2024-11-20 SDOH — HEALTH STABILITY: MENTAL HEALTH
HAVE YOU STARTED TO WORK OUT OR WORKED OUT THE DETAILS OF HOW TO KILL YOURSELF? DO YOU INTENT TO CARRY OUT THIS PLAN?: NO

## 2024-11-20 SDOH — HEALTH STABILITY: MENTAL HEALTH: IN THE PAST FEW WEEKS, HAVE YOU WISHED YOU WERE DEAD?: NO

## 2024-11-20 SDOH — SOCIAL STABILITY: SOCIAL NETWORK: PARENT/GUARDIAN/SIGNIFICANT OTHER INVOLVEMENT: ATTENTIVE TO PATIENT NEEDS

## 2024-11-20 SDOH — HEALTH STABILITY: MENTAL HEALTH: HOW DID YOU TRY TO KILL YOURSELF?: OD

## 2024-11-20 SDOH — HEALTH STABILITY: MENTAL HEALTH: DEPRESSION SYMPTOMS: CHANGE IN ENERGY LEVEL;ISOLATIVE;SLEEP DISTURBANCE

## 2024-11-20 SDOH — HEALTH STABILITY: MENTAL HEALTH: HAVE YOU WISHED YOU WERE DEAD OR WISHED YOU COULD GO TO SLEEP AND NOT WAKE UP?: NO

## 2024-11-20 SDOH — HEALTH STABILITY: MENTAL HEALTH: IN THE PAST FEW WEEKS, HAVE YOU FELT THAT YOU OR YOUR FAMILY WOULD BE BETTER OFF IF YOU WERE DEAD?: NO

## 2024-11-20 SDOH — HEALTH STABILITY: MENTAL HEALTH: NON-SPECIFIC ACTIVE SUICIDAL THOUGHTS (PAST 1 MONTH): NO

## 2024-11-20 SDOH — HEALTH STABILITY: MENTAL HEALTH: HAVE YOU BEEN THINKING ABOUT HOW YOU MIGHT DO THIS?: NO

## 2024-11-20 SDOH — HEALTH STABILITY: MENTAL HEALTH: SUICIDE ASSESSMENT: ADULT (C-SSRS)

## 2024-11-20 ASSESSMENT — LIFESTYLE VARIABLES
PRESCIPTION_ABUSE_PAST_12_MONTHS: NO
HAVE PEOPLE ANNOYED YOU BY CRITICIZING YOUR DRINKING: NO
EVER HAD A DRINK FIRST THING IN THE MORNING TO STEADY YOUR NERVES TO GET RID OF A HANGOVER: NO
SUBSTANCE_ABUSE_PAST_12_MONTHS: YES
TOTAL SCORE: 0
EVER FELT BAD OR GUILTY ABOUT YOUR DRINKING: NO
HAVE YOU EVER FELT YOU SHOULD CUT DOWN ON YOUR DRINKING: NO

## 2024-11-20 ASSESSMENT — PAIN - FUNCTIONAL ASSESSMENT: PAIN_FUNCTIONAL_ASSESSMENT: 0-10

## 2024-11-20 ASSESSMENT — PAIN SCALES - GENERAL
PAINLEVEL_OUTOF10: 0 - NO PAIN
PAINLEVEL_OUTOF10: 0 - NO PAIN

## 2024-11-20 NOTE — SIGNIFICANT EVENT
Application for Emergency Admission      Ready for Transfer?  Is the patient medically cleared for transfer to inpatient psychiatry: Yes  Has the patient been accepted to an inpatient psychiatric hospital: Yes    Application for Emergency Admission  IN ACCORDANCE [place]  The Chief Clinical Officer of: Karolina Keller 11/20/2024 .6:06 PM    Reason for Hospitalization  The undersigned has reason to believe that: Sylvia Archuleta Is a mentally ill person subject to hospitalization by court order under division B Section 5122.01 of the Revised Code, i.e., this person:    1.No  Represents a substantial risk of physical harm to self as manifested by evidence of threats of, or attempts at, suicide or serious self-inflicted bodily harm    2.No Represents a substantial risk of physical harm to others as manifested by evidence of recent homicidal or other violent behavior, evidence of recent threats that place another in reasonable fear of violent behavior and serious physical harm, or other evidence of present dangerousness    3.No Represents a substantial and immediate risk of serious physical impairment or injury to self as manifested by  evidence that the person is unable to provide for and is not providing for the person's basic physical needs because of the person's mental illness and that appropriate provision for those needs cannot be made  immediately available in the community    4.Yes Would benefit from treatment in a hospital for his mental illness and is in need of such treatment as manifested by evidence of behavior that creates a grave and imminent risk to substantial rights of others or  himself.    5.No Would benefit from treatment as manifested by evidence of behavior that indicates all of the following:       (a) The person is unlikely to survive safely in the community without supervision, based on a clinical determination.       (b) The person has a history of lack of compliance with  treatment for mental illness and one of the following applies:      (i) At least twice within the thirty-six months prior to the filing of an affidavit seeking court-ordered treatment of the person under section 5122.111 of the Revised Code, the lack of compliance has been a significant factor in necessitating hospitalization in a hospital or receipt of services in a forensic or other mental health unit of a correctional facility, provided that the thirty-six-month period shall be extended by the length of any hospitalization or incarceration of the person that occurred within the thirty-six-month period.      (ii) Within the forty-eight months prior to the filing of an affidavit seeking court-ordered treatment of the person under section 5122.111 of the Revised Code, the lack of compliance resulted in one or more acts of serious violent behavior toward self or others or threats of, or attempts at, serious physical harm to self or others, provided that the forty-eight-month period shall be extended by the length of any hospitalization or incarceration of the person that occurred within the forty-eight-month period.      (c) The person, as a result of mental illness, is unlikely to voluntarily participate in necessary treatment.       (d) In view of the person's treatment history and current behavior, the person is in need of treatment in order to prevent a relapse or deterioration that would be likely to result in substantial risk of serious harm to the person or others.    (e) Represents a substantial risk of physical harm to self or others if allowed to remain at liberty pending examination.    Therefore, it is requested that said person be admitted to the above named facility.    STATEMENT OF BELIEF    Must be filled out by one of the following: a psychiatrist, licensed physician, licensed clinical psychologist, health or ,  or .  (Statement shall include the circumstances under  which the individual was taken into custody and the reason for the person's belief that hospitalization is necessary. The statement shall also include a reference to efforts made to secure the individual's property at his residence if he was taken into custody there. Every reasonable and appropriate effort should be made to take this person into custody in the least conspicuous manner possible.)    Patient is having worsening auditory hallucinations.  Patient would benefit from restarting home medications    Pattie Cummings MD 11/20/2024     _____________________________________________________________   Place of Employment: [facility]    STATEMENT OF OBSERVATION BY PSYCHIATRIST, LICENSED PHYSICIAN, OR LICENSED CLINICAL PSYCHOLOGIST, IF APPLICABLE    Place of Observation (e.g., [facility] mental [facility] center, general hospital, office, emergency facility)  (If applicable, please complete)    Pattie Cummings MD 11/20/2024    _____________________________________________________________

## 2024-11-20 NOTE — ED TRIAGE NOTES
"Pt hearing voices in head telling her negative thoughts, having \"crazy thoughts\", and depression. Denies SI or HI. PT feeling very down, tearful and sleeping a lot. Pt homeless. States Gina wilcox told her to come to ED.  "

## 2024-11-20 NOTE — PROGRESS NOTES
EPAT - Social Work Psychiatric Assessment    Arrival Details  Mode of Arrival: Other (Comment) (walked in to the ED)  Admission Source: Other (Comment) (streets)  Admission Type: Voluntary  EPAT Assessment Start Date: 11/20/24  EPAT Assessment Start Time: 1400  Name of : Jeannette Holguin. LPC    History of Present Illness  Admission Reason: psych eval  HPI: Pt is 59 years old female who presented to the ED for a psychiatric evaluation. Pt has a history of paranoid schizophrenia, anxiety, depression and substance abuse disorder. Pt has a history of inpatient psychiatric admissions, with the most recent one at [facility] 08/24. Pt is seeing MH provider and a therapist at [facility]. Pt is compliant with some of her medications. She takes Seroquel. Pt’s chart, ED provider, and nursing notes were reviewed prior to the assessment. Upon arrival, pt reported AH/VH. Denied SI/HI. Pt’s BAL was negative and UDS was positive for cocaine and cannabis.    SW Readmission Information   Readmission within 30 Days: No    Psychiatric Symptoms  Anxiety Symptoms: Generalized  Depression Symptoms: Change in energy level, Isolative, Sleep disturbance  Gely Symptoms: No problems reported or observed.    Psychosis Symptoms  Hallucination Type: Auditory, Visual  Delusion Type: No problems reported or observed.    Additional Symptoms - Adult  Generalized Anxiety Disorder: Excessive anxiety/worry, Sleep disturbance, Difficult to control worry, Difficulity concentrating  Obsessive Compulsive Disorder: No problems reported or observed.  Panic Attack: No problems reported or observed.  Post Traumatic Stress Disorder: No problems reported or observed.  Delirium: No problems reported or observed.    Past Psychiatric History/Meds/Treatments  Past Psychiatric History: paranoid schizophrenia, anxiety, depression and substance abuse disorder  Past Psychiatric Meds/Treatments: W 08/24    Current Mental Health Contacts   Name/Phone  I just read all the notes from the cardiologist    I do not have openings if there is an opening okay to squeeze in    I strongly recommend what the cardiologist said I reinforce all of the recommendations   Patient has been struggling with the decreased dose and his anxiety has been off the charts. Patient has found himself over-using his medications and recognizes the old behaviors of when he was using his substance of choice. Patient was encouraged to get back in to the individual therapy but states that finacially it is too expensive. RN discussed use of EAP at work and to call and see if he could utilize therapy sessions to assist with healthy coping skills development. Patient was told at this time, he must only use the one film per day. Patient felt that he was given the alternative of trying the one per day but not necessarily have to do this daily. RN discussed using medication only as prescribed. Patient's prescription would be eligible for release on Monday and he only has a piece of a piece remaining. Patient asking for early release of medication. MD will authorize but RN did discuss need to be on track moving forward with one film per day and if needed to make an early appointment to see physician. Patient will call next week on Friday to give update regarding locating EAP counselor and sticking to medication regimen that MD has authorized.   Number: Rosio Kline  Provider Name/Phone Number: Rosio Kline    Support System: Community    Living Arrangement: Homeless         Income Information  Employment Status for: Patient  Employment Status: Disabled  Income Source: Disability    [place] Service/Education History  Current or Previous  Service: None         Legal  Legal Considerations: Patient/ Family Capacity to Make Sound Judgments  Criminal Activity/ Legal Involvement Pertinent to Current Situation/ Hospitalization: none    Drug Screening  Have you used any substances (canabis, cocaine, heroin, hallucinogens, inhalants, etc.) in the past 12 months?: Yes  Have you used any prescription drugs other than prescribed in the past 12 months?: No  Is a toxicology screen needed?: Yes    Stage of Change  Stage of Change: Contemplation  History of Treatment: Inpatient, Dual, AA/NA meetings, Sober living  Type of Treatment Offered: Other (Comment) ([facility])  Treatment Offered: Declined    Behavioral Health  Behavioral Health(WDL): Exceptions to WDL  Behaviors/Mood: Anxious, Appropriate for age, Appropriate for situation, Cooperative  Affect: Appropriate to circumstances    Orientation  Orientation Level: Oriented X4    General Appearance  Motor Activity: Unremarkable  Speech Pattern: Other (Comment) (WDL)  General Attitude: Cooperative  Appearance/Hygiene: Disheveled    Thought Process  Content: Unremarkable  Perception: Hallucinations  Hallucination: Auditory, Visual  Judgment/Insight: Poor  Confusion: None  Cognition: Appropriate safety awareness, Appropriate attention/concentration, Appropriate for developmental age, Follows commands, Poor judgement    Sleep Pattern  Sleep Pattern: Difficulty falling asleep, Disturbed/interrupted sleep    Risk Factors  Self Harm/Suicidal Ideation Plan: denied  Previous Self Harm/Suicidal Plans: SA via OD  Risk Factors: Age < 19 years old, Lower socioeconomic status, Major mental illness, Substance abuse    Violence  Risk Assessment  Assessment of Violence: None noted  Thoughts of Harm to Others: No    Ability to Assess Risk Screen  Risk Screen - Ability to Assess: Able to be screened  Ask Suicide-Screening Questions  1. In the past few weeks, have you wished you were dead?: No  2. In the past few weeks, have you felt that you or your family would be better off if you were dead?: No  3. In the past week, have you been having thoughts about killing yourself?: No  4. Have you ever tried to kill yourself?: Yes  How did you try to kill yourself?: OD  When did you try to kill yourself?: 2023  5. Are you having thoughts of killing yourself right now?: No  Calculated Risk Score: Potential Risk  [place] Suicide Severity Rating Scale (Screener/Recent Self-Report)  1. Wish to be Dead (Past 1 Month): No  2. Non-Specific Active Suicidal Thoughts (Past 1 Month): No  6. Suicidal Behavior (Lifetime): Yes  6. Suicidal Behavior (3 Months): No  Calculated C-SSRS Risk Score (Lifetime/Recent): Moderate Risk  Step 1: Risk Factors  Current & Past Psychiatric Dx: Mood disorder, Alcohol/substance abuse disorders, Recent onset  Presenting Symptoms: Anxiety and/or panic, Psychosis  Precipitants/Stressors: Substance intoxication or withdrawal, Pending incarceration or homelessness, Inadequate social supports, Social isolation  Change in Treatment: Non-compliant or not receiving treatment  Access to Lethal Methods : No  Step 2: Protective Factors   Protective Factors Internal: Ability to cope with stress, Frustration tolerance, Jew beliefs  Protective Factors External: Positive therapeutic relationships  Step 5: Documentation  Risk Level: Moderate suicide risk    Psychiatric Impression and Plan of Care  Assessment and Plan: The patient was interviewed via telehealth. Pt is 59 years old female with a history of paranoid schizophrenia, anxiety, depression, and substance abuse disorder, was brought to the ED for a psychiatric evaluation. During the  assessment, pt was lying in bed, dressed in hospital attire. Pt was anxious and cooperative. Pt stated that she feels “OK.” Pt stated that his current stressors are being homeless, not taking all of her medications, recent relapse, and AH/VH. Pt reported that she is hearing “voices that are mean to her.” Pt stated that “sometimes” they are telling her to hurt herself or others, but not today. Pt also reported seeing “ people.” Pt is chronically moderate risk on the [place] SSRS. Pt denied current SI. Pt has a history of SA via OD in . Pt was able to identify a support system: a therapist. Pt was able to identify protective factors: ability to cope with stress and frustration tolerance, spiritual beliefs against suicide, positive therapeutic relationship. Pt was future-oriented: “Having my own place.” Pt denied access to firearms. Pt denied HI. Pt wanted to be admitted. Pt is help-seeking. [facility] was offered to the pt, she declined, “I was at 2 rehabs, and I am not going back there!”         DX: psychosis.        At this time, pt does not meet the criteria for inpatient admission. However, pt wanted voluntarily admission due to AH/VH and to restart her medications again. Review with Dr. Barahona, who is in agreement.  Specific Resources Provided to Patient: voluntarily admission    Outcome/Disposition  Patient's Perception of Outcome Achieved: agreeable  Assessment, Recommendations and Risk Level Reviewed with: Dr. Barahona  EPAT Assessment Completed Date: 24  EPAT Assessment Completed Time: 1430    Social Work Note

## 2024-11-20 NOTE — ED PROVIDER NOTES
Emergency Department Provider Note        History of Present Illness     59-year-old female with history of schizophrenia, anxiety/depression, cocaine use, COPD presenting via triage for psychiatric evaluation.  States that she is hearing voices, stating they are speaking gibberish, telling her a lot of nonsense, stating also that they are telling her to hurt herself and others; she denies any current plan to hurt herself or others.  States she also is seeing visual hallucinations of her aunt.  She states she is compliant on her Seroquel.  States she called [facility] which she was previously admitted to however they told her to present to ED for evaluation.  She states that these hallucinations have been going on for the past few days.  She denies any medical complaints including any fevers, chills, rigors, cough, chest pain, shortness of breath, abdominal pain, nausea, vomiting, weakness, ataxia, headache, lightheadedness, dizziness, syncope, near syncope.    External Records Reviewed including ED notes, H&P, Discharge Summary, outpatient PCP/specialist notes.  Physical Exam     Triage Vitals: T 35.3 °C (95.5 °F)  HR 79  /79  RR 16  O2 98 %    GEN: NAD  EYES:  EOMs grossly intact, anicteric sclera  BASILIA: Mucosa moist.  NECK: Supple.  CARD: RRR  PULMONARY: Moving air well. Clear all lung fields.  ABDOMEN: Soft, no guarding, no rigidity. Nontender. NABS  EXTREMITIES: Full ROM, no pitting edema,   SKIN: Intact, warm and dry  NEURO: Alert and oriented x 3, speech is clear, no obvious deficits noted.   Psych: Calm, cool, collected, appropriate affect for situation    ED EKG interpretation:  Sinus rhythm rate of 66, normal intervals, no ST segment change, no change from prior    Medical Decision Making & ED Course     59-year-old female presenting for  and  with passive SI/HI for the past few days.  On exam she is well-appearing ambulating comfortably.  Vital signs stable.  Physical exam is unremarkable.   She is calm and collected and does not appear internally stimulated at this time.  Will check laboratory work, urine.  Will have EPAT evaluate her.    ED Course as of 11/20/24 1513   Wed Nov 20, 2024   1313 Microscopic Only, Urine(!)  UA with signs of UTI, will treat with Keflex empirically. [BUBBA]   1314 Laboratory work otherwise unremarkable with no leukocytosis or anemia, normal lecture lites, negative alcohol, drug screen positive for cannabinoids cocaine and PCP.   [BUBBA]   1314 She is medically clear for EPAT evaluation and psychiatric hospitalization and transfer if needed [BUBBA]   1513 Patient evaluated by EPAT.  Did not meet criteria for requiring pink slip for inpatient emergency psychiatric hospitalization.  However patient wants to voluntarily sign herself into a facility, I spoke with EPAT and they will try to help facilitate. [BUBBA]      ED Course User Index  [BUBBA] Daniel Thomas PA-C         Diagnoses as of 11/20/24 1513   Schizophrenia, unspecified type   Hallucinations   Polysubstance abuse (Multi)     No orders to display     Labs Reviewed   CBC WITH AUTO DIFFERENTIAL - Abnormal       Result Value    WBC 6.5      nRBC 0.0      RBC 5.40 (*)     Hemoglobin 16.2 (*)     Hematocrit 48.5 (*)     MCV 90      MCH 30.0      MCHC 33.4      RDW 12.6      Platelets 250      Neutrophils % 43.9      Immature Granulocytes %, Automated 0.2      Lymphocytes % 47.7      Monocytes % 6.3      Eosinophils % 1.4      Basophils % 0.5      Neutrophils Absolute 2.85      Immature Granulocytes Absolute, Automated 0.01      Lymphocytes Absolute 3.09      Monocytes Absolute 0.41      Eosinophils Absolute 0.09      Basophils Absolute 0.03     DRUG SCREEN,URINE - Abnormal    Amphetamine Screen, Urine Presumptive Negative      Barbiturate Screen, Urine Presumptive Negative      Benzodiazepines Screen, Urine Presumptive Negative      Cannabinoid Screen, Urine Presumptive Positive (*)     Cocaine Metabolite Screen, Urine Presumptive  Positive (*)     Fentanyl Screen, Urine Presumptive Negative      Opiate Screen, Urine Presumptive Negative      Oxycodone Screen, Urine Presumptive Negative      PCP Screen, Urine Presumptive Positive (*)     Methadone Screen, Urine Presumptive Negative      Narrative:     Drug screen results are presumptive and should not be used to assess   compliance with prescribed medication. Contact the performing [facility] laboratory   to add-on definitive confirmatory testing if clinically indicated.    Toxicology screening results are reported qualitatively. The concentration must   be greater than or equal to the cutoff to be reported as positive. The concentration   at which the screening test can detect an individual drug or metabolite varies.   The absence of expected drug(s) and/or drug metabolite(s) may indicate non-compliance,   inappropriate timing of specimen collection relative to drug administration, poor drug   absorption, diluted/adulterated urine, or limitations of testing. For medical purposes   only; not valid for forensic use.    Interpretive questions should be directed to the laboratory medical directors.   URINALYSIS WITH REFLEX CULTURE AND MICROSCOPIC - Abnormal    Color, Urine Light-Yellow      Appearance, Urine Clear      Specific Gravity, Urine 1.028      pH, Urine 5.5      Protein, Urine NEGATIVE      Glucose, Urine Normal      Blood, Urine 0.2 (2+) (*)     Ketones, Urine NEGATIVE      Bilirubin, Urine NEGATIVE      Urobilinogen, Urine Normal      Nitrite, Urine NEGATIVE      Leukocyte Esterase, Urine 500 Ike/µL (*)    MICROSCOPIC ONLY, URINE - Abnormal    WBC, Urine 11-20 (*)     RBC, Urine 6-10 (*)     Squamous Epithelial Cells, Urine 1-9 (SPARSE)      Mucus, Urine FEW     COMPREHENSIVE METABOLIC PANEL - Normal    Glucose 89      Sodium 140      Potassium 4.0      Chloride 106      Bicarbonate 25      Anion Gap 13      Urea Nitrogen 19      Creatinine 0.78      eGFR 88      Calcium 9.5      Albumin  4.8      Alkaline Phosphatase 110      Total Protein 7.9      AST 23      Bilirubin, Total 0.5      ALT 22     ACUTE TOXICOLOGY PANEL, BLOOD - Normal    Acetaminophen <10.0      Salicylate  <3      Alcohol <10     TSH WITH REFLEX TO FREE T4 IF ABNORMAL - Normal    Thyroid Stimulating Hormone 2.04      Narrative:     TSH testing is performed using different testing methodology at [facility] than at other [facility]. Direct result comparisons should only be made within the same method.     URINE CULTURE   GREEN TOP    Extra Tube Hold for add-ons.     GREEN TOP    Extra Tube Hold for add-ons.     URINALYSIS WITH REFLEX CULTURE AND MICROSCOPIC    Narrative:     The following orders were created for panel order Urinalysis with Reflex Culture and Microscopic.  Procedure                               Abnormality         Status                     ---------                               -----------         ------                     Urinalysis with Reflex C...[[id number]]  Abnormal            Final result               Extra Urine Gray Tube[[id number]]                            In process                   Please view results for these tests on the individual orders.   EXTRA URINE GRAY TUBE       ----------------------------------------------------------------------------------------------------------------------------    This note was dictated using a speech recognition program.  While an attempt was made at proof reading to minimize errors, minor errors in transcription may be present call for questions.     Daniel Thomas PA-C  11/20/24 5107

## 2024-11-21 LAB — BACTERIA UR CULT: NORMAL

## 2024-11-26 ENCOUNTER — APPOINTMENT (OUTPATIENT)
Dept: PRIMARY CARE | Facility: CLINIC | Age: 59
End: 2024-11-26
Payer: COMMERCIAL

## 2024-11-29 ENCOUNTER — APPOINTMENT (OUTPATIENT)
Dept: PRIMARY CARE | Facility: CLINIC | Age: 59
End: 2024-11-29
Payer: COMMERCIAL

## 2024-12-02 ENCOUNTER — APPOINTMENT (OUTPATIENT)
Dept: PRIMARY CARE | Facility: CLINIC | Age: 59
End: 2024-12-02
Payer: COMMERCIAL

## 2024-12-02 DIAGNOSIS — I10 PRIMARY HYPERTENSION: ICD-10-CM

## 2024-12-02 DIAGNOSIS — E78.00 ELEVATED LDL CHOLESTEROL LEVEL: ICD-10-CM

## 2024-12-02 DIAGNOSIS — E03.9 HYPOTHYROIDISM, UNSPECIFIED TYPE: ICD-10-CM

## 2024-12-02 DIAGNOSIS — E55.9 VITAMIN D DEFICIENCY: ICD-10-CM

## 2024-12-02 DIAGNOSIS — I10 BENIGN ESSENTIAL HYPERTENSION: ICD-10-CM

## 2024-12-02 DIAGNOSIS — D50.9 IRON DEFICIENCY ANEMIA, UNSPECIFIED IRON DEFICIENCY ANEMIA TYPE: ICD-10-CM

## 2024-12-02 DIAGNOSIS — Z00.00 ENCOUNTER FOR ANNUAL PHYSICAL EXAM: ICD-10-CM

## 2025-03-27 ENCOUNTER — APPOINTMENT (OUTPATIENT)
Dept: OBSTETRICS AND GYNECOLOGY | Facility: CLINIC | Age: 60
End: 2025-03-27
Payer: COMMERCIAL

## 2025-05-27 DIAGNOSIS — Z12.11 COLON CANCER SCREENING: Primary | ICD-10-CM

## 2025-05-27 RX ORDER — POLYETHYLENE GLYCOL 3350, SODIUM CHLORIDE, SODIUM BICARBONATE, POTASSIUM CHLORIDE 420; 11.2; 5.72; 1.48 G/4L; G/4L; G/4L; G/4L
4000 POWDER, FOR SOLUTION ORAL ONCE
Qty: 4000 ML | Refills: 0 | Status: SHIPPED | OUTPATIENT
Start: 2025-05-27 | End: 2025-05-27

## 2025-08-19 ENCOUNTER — ANESTHESIA (OUTPATIENT)
Dept: GASTROENTEROLOGY | Facility: HOSPITAL | Age: 60
End: 2025-08-19
Payer: COMMERCIAL

## 2025-08-19 ENCOUNTER — ANESTHESIA EVENT (OUTPATIENT)
Dept: GASTROENTEROLOGY | Facility: HOSPITAL | Age: 60
End: 2025-08-19

## 2025-08-25 DIAGNOSIS — Z12.11 COLON CANCER SCREENING: Primary | ICD-10-CM

## 2025-08-25 RX ORDER — POLYETHYLENE GLYCOL 3350, SODIUM SULFATE ANHYDROUS, SODIUM BICARBONATE, SODIUM CHLORIDE, POTASSIUM CHLORIDE 236; 22.74; 6.74; 5.86; 2.97 G/4L; G/4L; G/4L; G/4L; G/4L
4000 POWDER, FOR SOLUTION ORAL ONCE
Qty: 4000 ML | Refills: 0 | Status: SHIPPED | OUTPATIENT
Start: 2025-08-25 | End: 2025-08-25

## 2025-08-29 ENCOUNTER — APPOINTMENT (OUTPATIENT)
Dept: GASTROENTEROLOGY | Facility: HOSPITAL | Age: 60
End: 2025-08-29
Payer: COMMERCIAL

## 2025-09-15 ENCOUNTER — APPOINTMENT (OUTPATIENT)
Dept: PRIMARY CARE | Facility: HOSPITAL | Age: 60
End: 2025-09-15
Payer: COMMERCIAL

## 2025-12-05 ENCOUNTER — APPOINTMENT (OUTPATIENT)
Dept: GASTROENTEROLOGY | Facility: HOSPITAL | Age: 60
End: 2025-12-05
Payer: COMMERCIAL